# Patient Record
Sex: FEMALE | Race: OTHER | HISPANIC OR LATINO
[De-identification: names, ages, dates, MRNs, and addresses within clinical notes are randomized per-mention and may not be internally consistent; named-entity substitution may affect disease eponyms.]

---

## 2017-02-07 PROBLEM — Z00.00 ENCOUNTER FOR PREVENTIVE HEALTH EXAMINATION: Status: ACTIVE | Noted: 2017-02-07

## 2020-10-05 ENCOUNTER — APPOINTMENT (OUTPATIENT)
Dept: NEUROLOGY | Facility: CLINIC | Age: 80
End: 2020-10-05
Payer: MEDICARE

## 2020-10-05 VITALS — TEMPERATURE: 97.2 F

## 2020-10-05 VITALS — HEART RATE: 102 BPM | SYSTOLIC BLOOD PRESSURE: 138 MMHG | WEIGHT: 162 LBS | DIASTOLIC BLOOD PRESSURE: 92 MMHG

## 2020-10-05 DIAGNOSIS — G20 PARKINSON'S DISEASE: ICD-10-CM

## 2020-10-05 DIAGNOSIS — Z78.9 OTHER SPECIFIED HEALTH STATUS: ICD-10-CM

## 2020-10-05 DIAGNOSIS — G31.84 MILD COGNITIVE IMPAIRMENT, SO STATED: ICD-10-CM

## 2020-10-05 PROCEDURE — 99205 OFFICE O/P NEW HI 60 MIN: CPT

## 2020-10-05 RX ORDER — VALSARTAN 80 MG/1
80 TABLET, COATED ORAL
Qty: 90 | Refills: 0 | Status: COMPLETED | COMMUNITY
Start: 2020-09-10

## 2020-10-05 NOTE — HISTORY OF PRESENT ILLNESS
[FreeTextEntry1] : HPI: 79yo RH HW, with HNT, here for concerns of memory loss.\par \par PMH:\par since -11y, family has noticed the pt is more forgetful, repetitive, and more recently her hygiene has been lacking. This has progressed recently. \par \par -Memory: STM, eventes, conversations\par -Speech ok:\par -Orientation: ok\par -Praxis: seems ok\par -Decision making/Executive fx/Multitasking: limited\par \par -Sleep: seems to be ok; no naps\par \par -Appetite: ok, no changes\par \par -Motor symptoms: limited gait by knees pain, and fear of falling\par \par -B/B: unclear, maybe urine\par \par -Psychiatric symptoms: seems to be in a good mood, no agitation either\par \par -Functional status:\par ADL: needs help and prompting for hygiene, bathing and transfer\par IADL: limited\par CDR: 1.0\par \par -Professional status: \par \par PCP and other physicians:\par -PCP: \par Dr. Jeffrey Chaney\par Doctor in [place], [place]\par Address: [address] [place] [id number]\par Phone: [phone]\par \par Workup done: none.

## 2020-10-05 NOTE — PHYSICAL EXAM
[General Appearance - Alert] : alert [General Appearance - In No Acute Distress] : in no acute distress [Impaired Insight] : insight and judgment were intact [Affect] : the affect was normal [Person] : oriented to person [Remote Intact] : remote memory intact [Registration Intact] : recent registration memory intact [Concentration Intact] : normal concentrating ability [Visual Intact] : visual attention was ~T not ~L decreased [Naming Objects] : no difficulty naming common objects [Repeating Phrases] : no difficulty repeating a phrase [Writing A Sentence] : no difficulty writing a sentence [Fluency] : fluency intact [Comprehension] : comprehension intact [Reading] : reading intact [Past History] : adequate knowledge of personal past history [Vocabulary] : adequate range of vocabulary [Total Score ___ / 30] : the patient achieved a score of [unfilled] /30 [Date / Time ___ / 5] : date / time [unfilled] / 5 [Place ___ / 5] : place [unfilled] / 5 [Registration ___ / 3] : registration [unfilled] / 3 [Serial Sevens ___/5] : serial sevens [unfilled] / 5 [Naming 2 Objects ___ / 2] : naming two objects [unfilled] / 2 [Repeating a Sentence ___ / 1] : repeating a sentence [unfilled] / 1 [Writing a Sentence ___ / 1] : write sentence [unfilled] / 1 [3-stage Verbal Command ___ / 3] : three-stage verbal command [unfilled] / 3 [Written Command ___ / 1] : written command [unfilled] / 1 [Copy a Design ___ / 1] : copy a design [unfilled] / 1 [Recall ___ / 3] : recall [unfilled] / 3 [Cranial Nerves Optic (II)] : visual acuity intact bilaterally,  visual fields full to confrontation, pupils equal round and reactive to light [Cranial Nerves Oculomotor (III)] : extraocular motion intact [Cranial Nerves Trigeminal (V)] : facial sensation intact symmetrically [Cranial Nerves Facial (VII)] : face symmetrical [Cranial Nerves Vestibulocochlear (VIII)] : hearing was intact bilaterally [Cranial Nerves Glossopharyngeal (IX)] : tongue and palate midline [Cranial Nerves Accessory (XI - Cranial And Spinal)] : head turning and shoulder shrug symmetric [Cranial Nerves Hypoglossal (XII)] : there was no tongue deviation with protrusion [Motor Strength] : muscle strength was normal in all four extremities [Involuntary Movements] : no involuntary movements were seen [No Muscle Atrophy] : normal bulk in all four extremities [Motor Handedness Right-Handed] : the patient is right hand dominant [Sensation Tactile Decrease] : light touch was intact [Sensation Pain / Temperature Decrease] : pain and temperature was intact [Sensation Vibration Decrease] : vibration was intact [Proprioception] : proprioception was intact [Balance] : balance was intact [2+] : Brachioradialis left 2+ [1+] : Ankle jerk left 1+ [Sclera] : the sclera and conjunctiva were normal [PERRL With Normal Accommodation] : pupils were equal in size, round, reactive to light, with normal accommodation [Extraocular Movements] : extraocular movements were intact [Optic Disc Abnormality] : the optic disc were normal in size and color [No APD] : no afferent pupillary defect [No JOEY] : no internuclear ophthalmoplegia [Full Visual Field] : full visual field [Outer Ear] : the ears and nose were normal in appearance [Oropharynx] : the oropharynx was normal [Neck Appearance] : the appearance of the neck was normal [Neck Cervical Mass (___cm)] : no neck mass was observed [Jugular Venous Distention Increased] : there was no jugular-venous distention [Thyroid Diffuse Enlargement] : the thyroid was not enlarged [Thyroid Nodule] : there were no palpable thyroid nodules [Auscultation Breath Sounds / Voice Sounds] : lungs were clear to auscultation bilaterally [Heart Rate And Rhythm] : heart rate was normal and rhythm regular [Heart Sounds] : normal S1 and S2 [Heart Sounds Gallop] : no gallops [Murmurs] : no murmurs [Heart Sounds Pericardial Friction Rub] : no pericardial rub [Arterial Pulses Carotid] : carotid pulses were normal with no bruits [Full Pulse] : the pedal pulses are present [Edema] : there was no peripheral edema [Bowel Sounds] : normal bowel sounds [Abdomen Soft] : soft [Abdomen Tenderness] : non-tender [Abdomen Mass (___ Cm)] : no abdominal mass palpated [No CVA Tenderness] : no ~M costovertebral angle tenderness [No Spinal Tenderness] : no spinal tenderness [Abnormal Walk] : normal gait [Nail Clubbing] : no clubbing  or cyanosis of the fingernails [Musculoskeletal - Swelling] : no joint swelling seen [Motor Tone] : muscle strength and tone were normal [Skin Color & Pigmentation] : normal skin color and pigmentation [Skin Turgor] : normal skin turgor [] : no rash [Place] : disoriented to place [Time] : disoriented to time [Short Term Intact] : short term memory impaired [Span Intact] : the attention span was decreased [Current Events] : inadequate knowledge of current events [Motor Strength Upper Extremities Bilaterally] : strength was normal in both upper extremities [Motor Strength Lower Extremities Bilaterally] : strength was normal in both lower extremities [Romberg's Sign] : Romberg's sign was negtive [Allodynia] : no ~T allodynia present [Dysesthesia] : no dysesthesia [Hyperesthesia] : no hyperesthesia [Limited Balance] : balance was intact [Past-pointing] : there was no past-pointing [Tremor] : no tremor present [Dysdiadochokinesia Bilaterally] : not present [Coordination - Dysmetria Impaired Finger-to-Nose Bilateral] : not present [Coordination - Dysmetria Impaired Heel-to-Shin Bilateral] : not present [Plantar Reflex Right Only] : normal on the right [Plantar Reflex Left Only] : normal on the left [FreeTextEntry4] : Mental Status Exam\par Presidents: 0/5\par Alternating Pattern: seems to be writing a word instead\par Spiral: ok\par Clock: 1/3-numbers 1-10 only\par Repetition: ok\par R/L discrimination on self and examiner:\par Cross-line commands: \par Praxis:\par -Motor:\par -Dynamic/Luria:\par -Ideomotor/Imitation: \par -Ideational/writing/closing-in:\par -Dressing:\par  [FreeTextEntry6] : L>R increased tone, paratonic but also cogwheel; reduced MANNY in L>R, UE and LE [FreeTextEntry8] : Slow gait, cautioned, due to pain in knees, but also with reduced swing in LUE.

## 2020-10-05 NOTE — ASSESSMENT
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\par \par Plan:\par Rule out reversible and vascular causes:\par -B vitamins, TFT, RPR\par -MRI brain w/o cornelius\par -Lyme serology\par -try Aricept 5mg and increase as tolerated\par -recommend active lifestyle.\par \par A thorough discussion was entertained with the patient/caregiver regarding the use of psychoactive medications, their possible benefits and AE profile, including the risk of cardiovascular complications, including but not limited to applicable black box warning and teratogenicity, where appropriate.\par We discussed the benefits of being active, physically and mentally, and the need to to establish a routine in this respect.\par Driving abilities and firearms possession and use were discussed, in relation to progression of the cognitive decline, and the need to assess them periodically.\par Patient/caregiver advised to bring previous records to this office.\par Patient/caregiver fully understands and agree with the plan.\par

## 2020-11-29 ENCOUNTER — APPOINTMENT (OUTPATIENT)
Dept: MRI IMAGING | Facility: CLINIC | Age: 80
End: 2020-11-29
Payer: MEDICARE

## 2020-11-29 ENCOUNTER — OUTPATIENT (OUTPATIENT)
Dept: OUTPATIENT SERVICES | Facility: HOSPITAL | Age: 80
LOS: 1 days | End: 2020-11-29
Payer: COMMERCIAL

## 2020-11-29 DIAGNOSIS — G31.84 MILD COGNITIVE IMPAIRMENT OF UNCERTAIN OR UNKNOWN ETIOLOGY: ICD-10-CM

## 2020-11-29 PROCEDURE — 70551 MRI BRAIN STEM W/O DYE: CPT

## 2020-11-29 PROCEDURE — 70551 MRI BRAIN STEM W/O DYE: CPT | Mod: 26

## 2021-09-26 ENCOUNTER — NON-APPOINTMENT (OUTPATIENT)
Age: 81
End: 2021-09-26

## 2021-09-27 ENCOUNTER — LABORATORY RESULT (OUTPATIENT)
Age: 81
End: 2021-09-27

## 2021-09-27 ENCOUNTER — MED ADMIN CHARGE (OUTPATIENT)
Age: 81
End: 2021-09-27

## 2021-09-27 ENCOUNTER — APPOINTMENT (OUTPATIENT)
Dept: GERIATRICS | Facility: CLINIC | Age: 81
End: 2021-09-27
Payer: MEDICARE

## 2021-09-27 VITALS
HEIGHT: 66 IN | WEIGHT: 159.25 LBS | HEART RATE: 105 BPM | RESPIRATION RATE: 15 BRPM | DIASTOLIC BLOOD PRESSURE: 80 MMHG | OXYGEN SATURATION: 96 % | SYSTOLIC BLOOD PRESSURE: 130 MMHG | BODY MASS INDEX: 25.59 KG/M2

## 2021-09-27 DIAGNOSIS — Z23 ENCOUNTER FOR IMMUNIZATION: ICD-10-CM

## 2021-09-27 DIAGNOSIS — E78.5 HYPERLIPIDEMIA, UNSPECIFIED: ICD-10-CM

## 2021-09-27 PROCEDURE — 99204 OFFICE O/P NEW MOD 45 MIN: CPT

## 2021-09-27 PROCEDURE — G0008: CPT

## 2021-09-27 PROCEDURE — 90662 IIV NO PRSV INCREASED AG IM: CPT

## 2021-09-27 RX ORDER — DONEPEZIL HYDROCHLORIDE 10 MG/1
10 TABLET ORAL DAILY
Qty: 30 | Refills: 2 | Status: DISCONTINUED | COMMUNITY
Start: 2020-10-05 | End: 2021-09-27

## 2021-09-27 RX ORDER — ASPIRIN 81 MG/1
81 TABLET, CHEWABLE ORAL
Refills: 0 | Status: ACTIVE | COMMUNITY
Start: 2021-04-01

## 2021-09-28 LAB
25(OH)D3 SERPL-MCNC: 21.6 NG/ML
ANION GAP SERPL CALC-SCNC: 12 MMOL/L
APPEARANCE: ABNORMAL
BASOPHILS # BLD AUTO: 0.04 K/UL
BASOPHILS NFR BLD AUTO: 0.7 %
BILIRUBIN URINE: NEGATIVE
BLOOD URINE: ABNORMAL
BUN SERPL-MCNC: 17 MG/DL
CALCIUM SERPL-MCNC: 9.6 MG/DL
CHLORIDE SERPL-SCNC: 106 MMOL/L
CHOLEST SERPL-MCNC: 146 MG/DL
CO2 SERPL-SCNC: 24 MMOL/L
COLOR: YELLOW
CREAT SERPL-MCNC: 0.71 MG/DL
EOSINOPHIL # BLD AUTO: 0.13 K/UL
EOSINOPHIL NFR BLD AUTO: 2.4 %
ESTIMATED AVERAGE GLUCOSE: 128 MG/DL
FOLATE SERPL-MCNC: >20 NG/ML
GLUCOSE QUALITATIVE U: NEGATIVE
GLUCOSE SERPL-MCNC: 117 MG/DL
HBA1C MFR BLD HPLC: 6.1 %
HCT VFR BLD CALC: 44 %
HDLC SERPL-MCNC: 59 MG/DL
HGB BLD-MCNC: 14.3 G/DL
IMM GRANULOCYTES NFR BLD AUTO: 0.2 %
KETONES URINE: NEGATIVE
LDLC SERPL CALC-MCNC: 57 MG/DL
LEUKOCYTE ESTERASE URINE: ABNORMAL
LYMPHOCYTES # BLD AUTO: 2.17 K/UL
LYMPHOCYTES NFR BLD AUTO: 40 %
MAN DIFF?: NORMAL
MCHC RBC-ENTMCNC: 31 PG
MCHC RBC-ENTMCNC: 32.5 GM/DL
MCV RBC AUTO: 95.4 FL
MONOCYTES # BLD AUTO: 0.42 K/UL
MONOCYTES NFR BLD AUTO: 7.7 %
NEUTROPHILS # BLD AUTO: 2.65 K/UL
NEUTROPHILS NFR BLD AUTO: 49 %
NITRITE URINE: NEGATIVE
NONHDLC SERPL-MCNC: 87 MG/DL
PH URINE: 6
PLATELET # BLD AUTO: 230 K/UL
POTASSIUM SERPL-SCNC: 4.2 MMOL/L
PROTEIN URINE: ABNORMAL
RBC # BLD: 4.61 M/UL
RBC # FLD: 13.2 %
SODIUM SERPL-SCNC: 142 MMOL/L
SPECIFIC GRAVITY URINE: 1.03
TRIGL SERPL-MCNC: 151 MG/DL
TSH SERPL-ACNC: 2.78 UIU/ML
UROBILINOGEN URINE: NORMAL
VIT B12 SERPL-MCNC: 867 PG/ML
WBC # FLD AUTO: 5.42 K/UL

## 2021-09-28 NOTE — HISTORY OF PRESENT ILLNESS
[No falls in past year] : Patient reported no falls in the past year [0] : 2) Feeling down, depressed, or hopeless: Not at all (0) [FreeTextEntry1] : Mrs. Rivera is a 80 yo F coming from home lives with her  Kenny. Accompanied to current visit by her daughter Alysia (who lives in [place]) and her relative / caretaker Zeynep. History obtained from pt and assisted by family.  Ambulates with a cane. Has Hx of mixed dementia (AD, vascular and Parkinsons?), HLD, HTN, bilateral knee OA and low vitamin D.\par \nelli Comes to the office as an initial visit to establish care. Complains of memory problems, daughter reported has noticed this changes for the past 2 years. She reported noticed pt becomes constantly repetitive in her statements. She had memory testing done in Oct 2020 showing score of 16/30 and had L>R increased tone, paratonic but also cogwheel; reduced MANNY in L>R, UE and LE. She was RX Donepezil 5mg BID, but daughter reported this was DC'ed is unsure of reaction from it that made her stop the medication. She had a brain MRI on Nov 2020 showing moderate periventricular, deep and subcortical white matter ischemia and mild atrophy. \par \par Zeynep reported pt becomes resistant to hygiene sometimes and has a hard time getting the pt to shower. She also reported has noticed pt has episodes of urine incontinence when she is not able to make it to the bathroom in time. \par \par He last hospital admission was last year after having a UTI, at that time she was admitted in [facility] for 2 days.\par \par Her  assists her with cooking, housekeeping, taking her medications. Her daughter takes her to doctors appointments. Zeynep is with her from 4pm-9pm daily. \par \par No recent falls reported, she ambulates with a cane. She complains of bilateral knee pain was told 2/2 OA and that she will need a knee replacement but her daughter opted for conservative management. She has tried topical injections with partial relief. She reported will be meeting with Orthopedics today. \par \par Vaccinations: PNA / Shingles vaccine never. Flu shot 2020. \par Colonoscopy /  Mammography /  PAP smear / DEXA scan unsure. \par \par Advance directives not filled out.\par

## 2021-09-28 NOTE — DATA REVIEWED
[FreeTextEntry1] :    MR Head No Cont             Final  No Documents Attached       EXAM:  MR BRAIN   PROCEDURE DATE:  11/29/2020    INTERPRETATION:  MR brain  without gadolinium  CLINICAL INFORMATION: Mild cognitive impairment  TECHNIQUE:   Sagittal and axial T1-weighted images, axial FLAIR images, axial gradient echo and T2-weighted images and axial diffusion weighted images of the brain were obtained.  FINDINGS:   No prior similar studies are available for review.  The brain demonstrates moderate periventricular, deep and subcortical white matter ischemia.   No acute cerebral cortical infarct is found.   No intracranial hemorrhage is recognized. No mass effect is found in the brain.  The ventricles, sulci and basal cisterns show mild atrophy.  The vertebral and internal carotid arteries demonstrate expected flow voids indicating their patency.  The orbits are unremarkable.  The paranasal sinuses are clear. Mild LEFT mastoiditis is noted. The nasal cavity appears intact.  The nasopharynx is symmetric.  The central skull base and temporal bones are intact.  The calvarium appears unremarkable.  IMPRESSION:   moderate periventricular, deep and subcortical white matter ischemia. Mild atrophy. Mild LEFT mastoiditis.       JORJE LOMAS MD; Attending Radiologist This document has been electronically signed. Nov 29 2020  3:34PM      Ordered by: DANTE TORRES       Collected/Examined: 29Nov2020 12:07PM        Verified by: DANTE TORRES 29Nov2020 09:50PM         Result Communication: No patient communication needed at this time; Stage: Final         Performed at: [facility] [place]       Resulted: 29Nov2020 03:37PM       Last Updated: 29Nov2020 09:50PM       Accession: [id number]

## 2021-09-28 NOTE — REASON FOR VISIT
[Initial Evaluation] : an initial evaluation [Family Member] : family member [FreeTextEntry2] : initial visit for memory problems

## 2021-09-28 NOTE — PHYSICAL EXAM
[Alert] : alert [Well Nourished] : well nourished [No Acute Distress] : in no acute distress [Well Developed] : well developed [Sclera] : the sclera and conjunctiva were normal [EOMI] : extraocular movements were intact [PERRL] : pupils were equal in size, round, and reactive to light [Normal Oral Mucosa] : normal oral mucosa [No Oral Pallor] : no oral pallor [Normal Outer Ear/Nose] : the ears and nose were normal in appearance [Normal Hearing] : hearing was normal [Both Tympanic Membranes Were Examined] : both tympanic membranes were normal [Normal Appearance] : the appearance of the neck was normal [Supple] : the neck was supple [No Respiratory Distress] : no respiratory distress [No Acc Muscle Use] : no accessory muscle use [Respiration, Rhythm And Depth] : normal respiratory rhythm and effort [Auscultation Breath Sounds / Voice Sounds] : lungs were clear to auscultation bilaterally [Normal PMI] : the apical impulse was abnormal [Normal S1, S2] : normal S1 and S2 [Heart Rate And Rhythm] : heart rate was normal and rhythm regular [Bowel Sounds] : normal bowel sounds [Abdomen Tenderness] : non-tender [Abdomen Soft] : soft [No CVA Tenderness] : no CVA  tenderness [No Spinal Tenderness] : no spinal tenderness [No Clubbing, Cyanosis] : no clubbing or cyanosis of the fingernails [Motor Tone] : muscle strength and tone were normal [Normal Color / Pigmentation] : normal skin color and pigmentation [] : no rash [Normal Turgor] : normal skin turgor [Skin Lesions] : no skin lesions [Normal Affect] : the affect was normal [Normal Mood] : the mood was normal [Normal Gait] : abnormal gait [de-identified] : slow gait  [de-identified] : AO x 2 (person and place)

## 2021-10-08 RX ORDER — CALCIUM CARBONATE/VITAMIN D3 500MG-5MCG
500-200 TABLET ORAL
Refills: 0 | Status: DISCONTINUED | COMMUNITY
Start: 2020-10-05 | End: 2021-10-08

## 2021-11-12 ENCOUNTER — APPOINTMENT (OUTPATIENT)
Dept: GERIATRICS | Facility: CLINIC | Age: 81
End: 2021-11-12

## 2021-12-03 ENCOUNTER — APPOINTMENT (OUTPATIENT)
Dept: GERIATRICS | Facility: CLINIC | Age: 81
End: 2021-12-03

## 2021-12-23 ENCOUNTER — APPOINTMENT (OUTPATIENT)
Dept: GERIATRICS | Facility: CLINIC | Age: 81
End: 2021-12-23

## 2022-03-04 ENCOUNTER — APPOINTMENT (OUTPATIENT)
Dept: GERIATRICS | Facility: CLINIC | Age: 82
End: 2022-03-04
Payer: MEDICARE

## 2022-03-04 VITALS
TEMPERATURE: 97.8 F | HEIGHT: 66 IN | RESPIRATION RATE: 16 BRPM | SYSTOLIC BLOOD PRESSURE: 142 MMHG | DIASTOLIC BLOOD PRESSURE: 80 MMHG | OXYGEN SATURATION: 96 % | HEART RATE: 98 BPM

## 2022-03-04 DIAGNOSIS — M17.10 UNILATERAL PRIMARY OSTEOARTHRITIS, UNSPECIFIED KNEE: ICD-10-CM

## 2022-03-04 PROCEDURE — 99214 OFFICE O/P EST MOD 30 MIN: CPT

## 2022-03-04 RX ORDER — MULTIVIT-MIN/FOLIC/VIT K/LYCOP 400-300MCG
25 MCG TABLET ORAL DAILY
Qty: 30 | Refills: 5 | Status: ACTIVE | COMMUNITY
Start: 2021-10-08

## 2022-03-04 RX ORDER — DICLOFENAC SODIUM 10 MG/G
1 GEL TOPICAL
Qty: 1 | Refills: 0 | Status: ACTIVE | COMMUNITY
Start: 2022-03-04 | End: 1900-01-01

## 2022-03-04 NOTE — DATA REVIEWED
[FreeTextEntry1] :    MR Head No Cont             Final  No Documents Attached       EXAM:  MR BRAIN   PROCEDURE DATE:  11/29/2020    INTERPRETATION:  MR brain  without gadolinium  CLINICAL INFORMATION: Mild cognitive impairment  TECHNIQUE:   Sagittal and axial T1-weighted images, axial FLAIR images, axial gradient echo and T2-weighted images and axial diffusion weighted images of the brain were obtained.  FINDINGS:   No prior similar studies are available for review.  The brain demonstrates moderate periventricular, deep and subcortical white matter ischemia.   No acute cerebral cortical infarct is found.   No intracranial hemorrhage is recognized. No mass effect is found in the brain.  The ventricles, sulci and basal cisterns show mild atrophy.  The vertebral and internal carotid arteries demonstrate expected flow voids indicating their patency.  The orbits are unremarkable.  The paranasal sinuses are clear. Mild LEFT mastoiditis is noted. The nasal cavity appears intact.  The nasopharynx is symmetric.  The central skull base and temporal bones are intact.  The calvarium appears unremarkable.  IMPRESSION:   moderate periventricular, deep and subcortical white matter ischemia. Mild atrophy. Mild LEFT mastoiditis.       JORJE LOMAS MD; Attending Radiologist This document has been electronically signed. Nov 29 2020  3:34PM      Ordered by: DANTE TORRES       Collected/Examined: 29Nov2020 12:07PM        Verified by: DANTE TORRES 29Nov2020 09:50PM         Result Communication: No patient communication needed at this time; Stage: Final         Performed at: [facility] [place]       Resulted: 29Nov2020 03:37PM       Last Updated: 29Nov2020 09:50PM       Accession: [id number]

## 2022-03-04 NOTE — HISTORY OF PRESENT ILLNESS
[No falls in past year] : Patient reported no falls in the past year [0] : 2) Feeling down, depressed, or hopeless: Not at all (0) [FreeTextEntry1] : Mrs. Rivera is a 83 yo F coming from home lives with her  Kenny. Accompanied to current visit by her daughter Alysia (who lives in [place]).  Ambulates with a cane. Has Hx of mixed dementia (AD, vascular and Parkinsons?), HLD, HTN, bilateral knee OA and low vitamin D.\par \nelli Comes to the office for a follow up visit. Since last visit pt has decrease mobility due to worsening bilateral knee pain. She reported is now mostly wheelchair bound. She has been giving her Tylenol 1gr every 8 hrs with minimal improvement. \par \par She also remains resistant to taking a shower and sometimes get verbally and physically violent with HHA and . She also had episodes of stool and urine incontinence when she doesn’t make it to the bathroom on time, \par \par She also wakes up in the middle of the night confused about where she is and need reassurance from her  to redirect her. \par \par Since last visit she has also started complaining of feeling weak and nauseous and in one occasion her daughter called EMS, she reported at that time her VS were normal and episode self resolved. She does not vomit and episodes seem to be random, not during specific times a day or related to oral intake.  \par \par He last hospital admission was last year after having a UTI, at that time she was admitted in [facility] for 2 days.\par \par Her  assists her with cooking, housekeeping, taking her medications. Her daughter takes her to doctors appointments. Zeynep is with her from 4pm-9pm daily. \par \par No recent falls reported, she ambulates with a cane. She complains of bilateral knee pain was told 2/2 OA and that she will need a knee replacement but her daughter opted for conservative management. She has tried topical injections with partial relief. She reported will be meeting with Orthopedics today. \par \par Advance directives not filled out.\par

## 2022-03-04 NOTE — ASSESSMENT
[FreeTextEntry1] : - Patient has severely limited mobility and is experiencing difficulty with mobility related activities of daily living such as toileting, grooming, and bathing.  mobility limitations cannot be resolved by use of a walker. A transport wheelchair will improve patient's ability to participate in MRADLs in the home. The patient has a caregiver who is available and willing to provide assistance with a transport wheelchair. Her home has adequate access for the use of the wheelchair.\par \par - Will repeat memory testing on next visit, will consider referral to ADC program \par - f/u in 2 month for repeat blood testing

## 2022-03-04 NOTE — PHYSICAL EXAM
[Alert] : alert [Well Nourished] : well nourished [Well Developed] : well developed [Sclera] : the sclera and conjunctiva were normal [EOMI] : extraocular movements were intact [PERRL] : pupils were equal in size, round, and reactive to light [Normal Oral Mucosa] : normal oral mucosa [No Oral Pallor] : no oral pallor [Normal Outer Ear/Nose] : the ears and nose were normal in appearance [Normal Hearing] : hearing was normal [Both Tympanic Membranes Were Examined] : both tympanic membranes were normal [Normal Appearance] : the appearance of the neck was normal [Supple] : the neck was supple [No Respiratory Distress] : no respiratory distress [No Acc Muscle Use] : no accessory muscle use [Respiration, Rhythm And Depth] : normal respiratory rhythm and effort [Auscultation Breath Sounds / Voice Sounds] : lungs were clear to auscultation bilaterally [Normal PMI] : the apical impulse was abnormal [Normal S1, S2] : normal S1 and S2 [Heart Rate And Rhythm] : heart rate was normal and rhythm regular [Bowel Sounds] : normal bowel sounds [Abdomen Tenderness] : non-tender [Abdomen Soft] : soft [No CVA Tenderness] : no CVA  tenderness [No Spinal Tenderness] : no spinal tenderness [No Clubbing, Cyanosis] : no clubbing or cyanosis of the fingernails [Motor Tone] : muscle strength and tone were normal [Normal Color / Pigmentation] : normal skin color and pigmentation [] : no rash [Normal Turgor] : normal skin turgor [Skin Lesions] : no skin lesions [Normal Affect] : the affect was normal [Normal Mood] : the mood was normal [Normal Gait] : abnormal gait [de-identified] : slow gait  [de-identified] : AO x 2 (person and place)

## 2022-04-11 ENCOUNTER — APPOINTMENT (OUTPATIENT)
Dept: NEUROLOGY | Facility: CLINIC | Age: 82
End: 2022-04-11

## 2022-04-11 ENCOUNTER — APPOINTMENT (OUTPATIENT)
Dept: GERIATRICS | Facility: CLINIC | Age: 82
End: 2022-04-11

## 2022-06-03 ENCOUNTER — RX RENEWAL (OUTPATIENT)
Age: 82
End: 2022-06-03

## 2022-10-25 ENCOUNTER — APPOINTMENT (OUTPATIENT)
Dept: NEUROLOGY | Facility: CLINIC | Age: 82
End: 2022-10-25

## 2022-11-14 ENCOUNTER — NON-APPOINTMENT (OUTPATIENT)
Age: 82
End: 2022-11-14

## 2022-11-14 DIAGNOSIS — U07.1 COVID-19: ICD-10-CM

## 2022-11-18 ENCOUNTER — LABORATORY RESULT (OUTPATIENT)
Age: 82
End: 2022-11-18

## 2023-07-18 ENCOUNTER — EMERGENCY (EMERGENCY)
Facility: HOSPITAL | Age: 83
LOS: 1 days | Discharge: HOME | End: 2023-07-18
Attending: EMERGENCY MEDICINE
Payer: COMMERCIAL

## 2023-07-18 VITALS
WEIGHT: 130.07 LBS | OXYGEN SATURATION: 95 % | DIASTOLIC BLOOD PRESSURE: 85 MMHG | TEMPERATURE: 98 F | RESPIRATION RATE: 18 BRPM | HEIGHT: 63 IN | SYSTOLIC BLOOD PRESSURE: 159 MMHG | HEART RATE: 91 BPM

## 2023-07-18 LAB
ALBUMIN SERPL ELPH-MCNC: 4 G/DL — SIGNIFICANT CHANGE UP (ref 3.3–5)
ALP SERPL-CCNC: 61 U/L — SIGNIFICANT CHANGE UP (ref 40–120)
ALT FLD-CCNC: 54 U/L — HIGH (ref 10–45)
ANION GAP SERPL CALC-SCNC: 13 MMOL/L — SIGNIFICANT CHANGE UP (ref 5–17)
AST SERPL-CCNC: 111 U/L — HIGH (ref 10–40)
BASOPHILS # BLD AUTO: 0.04 K/UL — SIGNIFICANT CHANGE UP (ref 0–0.2)
BASOPHILS NFR BLD AUTO: 0.5 % — SIGNIFICANT CHANGE UP (ref 0–2)
BILIRUB SERPL-MCNC: 0.6 MG/DL — SIGNIFICANT CHANGE UP (ref 0.2–1.2)
BUN SERPL-MCNC: 24 MG/DL — HIGH (ref 7–23)
CALCIUM SERPL-MCNC: 9.5 MG/DL — SIGNIFICANT CHANGE UP (ref 8.4–10.5)
CHLORIDE SERPL-SCNC: 108 MMOL/L — SIGNIFICANT CHANGE UP (ref 96–108)
CO2 SERPL-SCNC: 21 MMOL/L — LOW (ref 22–31)
CREAT SERPL-MCNC: 0.69 MG/DL — SIGNIFICANT CHANGE UP (ref 0.5–1.3)
EGFR: 86 ML/MIN/1.73M2 — SIGNIFICANT CHANGE UP
EOSINOPHIL # BLD AUTO: 0.04 K/UL — SIGNIFICANT CHANGE UP (ref 0–0.5)
EOSINOPHIL NFR BLD AUTO: 0.5 % — SIGNIFICANT CHANGE UP (ref 0–6)
GLUCOSE SERPL-MCNC: 107 MG/DL — HIGH (ref 70–99)
HCT VFR BLD CALC: 40.4 % — SIGNIFICANT CHANGE UP (ref 34.5–45)
HGB BLD-MCNC: 13.1 G/DL — SIGNIFICANT CHANGE UP (ref 11.5–15.5)
IMM GRANULOCYTES NFR BLD AUTO: 0.4 % — SIGNIFICANT CHANGE UP (ref 0–0.9)
LYMPHOCYTES # BLD AUTO: 2.1 K/UL — SIGNIFICANT CHANGE UP (ref 1–3.3)
LYMPHOCYTES # BLD AUTO: 25.6 % — SIGNIFICANT CHANGE UP (ref 13–44)
MCHC RBC-ENTMCNC: 31.1 PG — SIGNIFICANT CHANGE UP (ref 27–34)
MCHC RBC-ENTMCNC: 32.4 GM/DL — SIGNIFICANT CHANGE UP (ref 32–36)
MCV RBC AUTO: 96 FL — SIGNIFICANT CHANGE UP (ref 80–100)
MONOCYTES # BLD AUTO: 0.65 K/UL — SIGNIFICANT CHANGE UP (ref 0–0.9)
MONOCYTES NFR BLD AUTO: 7.9 % — SIGNIFICANT CHANGE UP (ref 2–14)
NEUTROPHILS # BLD AUTO: 5.35 K/UL — SIGNIFICANT CHANGE UP (ref 1.8–7.4)
NEUTROPHILS NFR BLD AUTO: 65.1 % — SIGNIFICANT CHANGE UP (ref 43–77)
NRBC # BLD: 0 /100 WBCS — SIGNIFICANT CHANGE UP (ref 0–0)
PLATELET # BLD AUTO: 200 K/UL — SIGNIFICANT CHANGE UP (ref 150–400)
POTASSIUM SERPL-MCNC: 4.4 MMOL/L — SIGNIFICANT CHANGE UP (ref 3.5–5.3)
POTASSIUM SERPL-SCNC: 4.4 MMOL/L — SIGNIFICANT CHANGE UP (ref 3.5–5.3)
PROT SERPL-MCNC: 7.4 G/DL — SIGNIFICANT CHANGE UP (ref 6–8.3)
RBC # BLD: 4.21 M/UL — SIGNIFICANT CHANGE UP (ref 3.8–5.2)
RBC # FLD: 12.9 % — SIGNIFICANT CHANGE UP (ref 10.3–14.5)
SODIUM SERPL-SCNC: 142 MMOL/L — SIGNIFICANT CHANGE UP (ref 135–145)
WBC # BLD: 8.21 K/UL — SIGNIFICANT CHANGE UP (ref 3.8–10.5)
WBC # FLD AUTO: 8.21 K/UL — SIGNIFICANT CHANGE UP (ref 3.8–10.5)

## 2023-07-18 PROCEDURE — 73030 X-RAY EXAM OF SHOULDER: CPT | Mod: 26,LT

## 2023-07-18 PROCEDURE — 99285 EMERGENCY DEPT VISIT HI MDM: CPT

## 2023-07-18 PROCEDURE — 73060 X-RAY EXAM OF HUMERUS: CPT | Mod: 26,LT

## 2023-07-18 PROCEDURE — 71045 X-RAY EXAM CHEST 1 VIEW: CPT | Mod: 26

## 2023-07-18 PROCEDURE — 72125 CT NECK SPINE W/O DYE: CPT | Mod: 26,MA

## 2023-07-18 PROCEDURE — 70450 CT HEAD/BRAIN W/O DYE: CPT | Mod: 26,MA

## 2023-07-18 NOTE — ED PROVIDER NOTE - PATIENT PORTAL LINK FT
You can access the FollowMyHealth Patient Portal offered by [facility] by registering at the following website: [email or web address]. By joining [facility]’s FollowMyHealth portal, you will also be able to view your health information using other applications (apps) compatible with our system.

## 2023-07-18 NOTE — ED ADULT NURSE NOTE - OBJECTIVE STATEMENT
Pt is a 84 y/o female with PMH dementia, HTN, HLD presenting to the ED s/p fall. History obtained from patient's daughter requesting to translate French, reporting patient had single unwitnessed mechanical fall at home last night, fall was heard by  at home. Pt usually ambulates with walker at baseline, has noted to be weaker by family the past few weeks, denies other recent falls. Pt does not remember falling, no blood thinner use present. Pt noted to be alert and resting comfortably in stretcher, following commands, speech is clear, sensory/motor function intact, PERRL, VSS, NAD noted. Pt denies pain/discomfort, changes in vision, dizziness, changes in vision, chest pain, SOB, numbness/tingling. Erythema noted L posterior head behind the ear, ecchymosis present on LUE, R knee. Pt is a 84 y/o female with PMH dementia, HTN, HLD presenting to the ED s/p fall. History obtained from patient's daughter requesting to translate Indonesian, reporting patient had single unwitnessed mechanical fall at home last night, fall was heard by  at home. Pt usually ambulates with walker at baseline, has noted to be weaker by family the past few weeks, denies other recent falls. Pt does not remember falling, no blood thinner use present. Pt noted to be alert and oriented to person and place, resting comfortably in stretcher, following commands, speech is clear, sensory/motor function intact, PERRL, VSS, NAD noted. Pt denies pain/discomfort, changes in vision, dizziness, changes in vision, chest pain, SOB, numbness/tingling. Erythema noted L posterior head behind the ear, ecchymosis present on LUE, R knee.

## 2023-07-18 NOTE — ED PROVIDER NOTE - PROGRESS NOTE DETAILS
Leonor Najera MD PGY-2: abd CT without acute traumatic injury. UA concerning for infection, will treat with 1 g Ceftriaxone and additional 5 day course of cefuroxime. daughter updated by phone. Plan for nonemergent transport home with home health aide. encouraged PMD follow up within next 1 week for re-evaluation. daughter expressed understanding of this plan. patient dc home in good condition. Leonor Najera MD PGY-2: LFTs still elevated, second sample not hemolyzed. will proceed with CT abd. daughter updated by phone. head/c spine CTs, XRs without acute traumatic findings. Leonor Najera MD PGY-2: LFTs elevated, specimen moderately hemolyzed. no abd tenderness on exam, however will repeat LFTs and would consider CT abd if persistently elevated to rule out possible liver injury

## 2023-07-18 NOTE — ED PROVIDER NOTE - PHYSICAL EXAMINATION
GENERAL: Awake, alert, NAD  HEAD: NC/AT, neck supple, moist mucous membranes  EYES: PERRL, EOM grossly intact, sclera anicteric  LUNGS: normal WOB on RA, CTAB, no wheezes or crackles   CARDIAC: RRR, no m/r/g  ABDOMEN: Soft, non tender, non distended, no rebound, no guarding  BACK: No midline spinal tenderness, no CVA tenderness  EXT: No edema, no calf tenderness, no deformities.  NEURO: A&Ox3. Moving all extremities.  SKIN: Warm and dry. No rash.  PSYCH: Normal affect. GENERAL: Awake, alert, NAD  HEAD: small area of erythema/swelling to posterior left scalp, neck supple, no midline cervical TTP, moist mucous membranes  EYES: PERRL, EOM grossly intact, sclera anicteric  LUNGS: normal WOB on RA, CTAB, no wheezes or crackles   CARDIAC: RRR, no m/r/g  ABDOMEN: Soft, non tender, non distended, no rebound, no guarding  BACK: No midline spinal tenderness, no CVA tenderness  EXT: Bruising noted to proximal left arm, full ROM of left shoulder, no bony deformities noted.  No lower extremity edema, no calf tenderness.  NEURO: A&Ox3. Moving all extremities. CN II-XII grossly intact bilaterally. strength 5/5 in all extremities. no focal deficits.   SKIN: Warm and dry. No rash.  PSYCH: Normal affect. GENERAL: Awake, alert, NAD  HEAD: small area of erythema/swelling to posterior left scalp, neck supple, no midline cervical TTP, moist mucous membranes  EYES: PERRL, EOM grossly intact, sclera anicteric  LUNGS: normal WOB on RA, CTAB, no wheezes or crackles   CARDIAC: RRR, no m/r/g  ABDOMEN: Soft, non tender, non distended, no rebound, no guarding  BACK: No midline spinal tenderness, no CVA tenderness  EXT: Bruising noted to proximal left arm, full ROM of left shoulder, no bony deformities noted.  No lower extremity edema, no calf tenderness.  NEURO: Moving all extremities. CN II-XII grossly intact bilaterally. strength 5/5 in all extremities. no focal deficits.   SKIN: Warm and dry. No rash.  PSYCH: Normal affect.

## 2023-07-18 NOTE — ED PROVIDER NOTE - ATTENDING CONTRIBUTION TO CARE
edited by Karen Jama DO - attending physician.   Please see progress notes for status/labs/consult updates and ED course after initial presentation.

## 2023-07-18 NOTE — ED PROVIDER NOTE - OBJECTIVE STATEMENT
83Y F PMH 83Y F PMH dementia, HTN here with home aide after unwitnessed fall at home last night. Hx provided by daughter via phone. Patient lives at home with , uses a walker at baseline.  found patient on the floor next to bed last night, patient suspected to have fallen but this was unwitnessed. Patient does not remember falling, does not know if she hit her head or lost consciousness. Aide noticed a bump on the back of patient's head today, sent photo to daughter who was concerned and called EMS to bring patient to the hospital. Patient currently has no complaints. Is at mental status baseline per aide/daughter.

## 2023-07-18 NOTE — ED PROVIDER NOTE - NSFOLLOWUPINSTRUCTIONS_ED_ALL_ED_FT
You were seen in the emergency department today after a fall at home.    All of your imaging today including CT scans of the head, neck, and abdomen were within normal limits.  Your blood work showed slightly elevated liver enzymes.  You should follow-up with your primary doctor to have this testing repeated. your urine showed evidence of infection.  All the results of the testing that was done today are included in this paperwork.    You were given a dose of an antibiotic called ceftriaxone through the IV while you were in the emergency department.  I sent a prescription for 5 days of an additional antibiotic called cefuroxime to your pharmacy.  Please take this twice per day for 5 days starting tomorrow.    You should follow-up with your primary doctor within the next 1 week to be reevaluated.    If you develop any new or worsening symptoms including headaches, loss of consciousness, chest pain, difficulty breathing, fevers, or you are otherwise concerned, please come back to the ER to be reevaluated.

## 2023-07-18 NOTE — ED PROVIDER NOTE - CLINICAL SUMMARY MEDICAL DECISION MAKING FREE TEXT BOX
83Y F PMH dementia, HTN here with home aide after unwitnessed fall last night, found on ground by bed this AM. Unknown LOC. Patient well appearing, at mental status baseline per aide, family. Area of erythema/mild swelling behind L ear, with bruising to L proximal arm. No AC use. Plan basic labs, CT head, C spine, XRs chest, L shoulder/humerus, UA/UC, reassess. if no acute traumatic injury likely dc home. spoke with daughter by phone who confirms history, requests additional updates.

## 2023-07-19 VITALS
DIASTOLIC BLOOD PRESSURE: 76 MMHG | TEMPERATURE: 98 F | OXYGEN SATURATION: 97 % | RESPIRATION RATE: 16 BRPM | HEART RATE: 77 BPM | SYSTOLIC BLOOD PRESSURE: 145 MMHG

## 2023-07-19 LAB
ALBUMIN SERPL ELPH-MCNC: 3.9 G/DL — SIGNIFICANT CHANGE UP (ref 3.3–5)
ALP SERPL-CCNC: 59 U/L — SIGNIFICANT CHANGE UP (ref 40–120)
ALT FLD-CCNC: 50 U/L — HIGH (ref 10–45)
ANION GAP SERPL CALC-SCNC: 12 MMOL/L — SIGNIFICANT CHANGE UP (ref 5–17)
APPEARANCE UR: CLEAR — SIGNIFICANT CHANGE UP
AST SERPL-CCNC: 92 U/L — HIGH (ref 10–40)
BACTERIA # UR AUTO: NEGATIVE — SIGNIFICANT CHANGE UP
BILIRUB SERPL-MCNC: 0.6 MG/DL — SIGNIFICANT CHANGE UP (ref 0.2–1.2)
BILIRUB UR-MCNC: NEGATIVE — SIGNIFICANT CHANGE UP
BUN SERPL-MCNC: 22 MG/DL — SIGNIFICANT CHANGE UP (ref 7–23)
CALCIUM SERPL-MCNC: 9.5 MG/DL — SIGNIFICANT CHANGE UP (ref 8.4–10.5)
CHLORIDE SERPL-SCNC: 109 MMOL/L — HIGH (ref 96–108)
CO2 SERPL-SCNC: 23 MMOL/L — SIGNIFICANT CHANGE UP (ref 22–31)
COLOR SPEC: SIGNIFICANT CHANGE UP
CREAT SERPL-MCNC: 0.71 MG/DL — SIGNIFICANT CHANGE UP (ref 0.5–1.3)
DIFF PNL FLD: NEGATIVE — SIGNIFICANT CHANGE UP
EGFR: 84 ML/MIN/1.73M2 — SIGNIFICANT CHANGE UP
EPI CELLS # UR: 3 /HPF — SIGNIFICANT CHANGE UP
GLUCOSE SERPL-MCNC: 116 MG/DL — HIGH (ref 70–99)
GLUCOSE UR QL: NEGATIVE — SIGNIFICANT CHANGE UP
HYALINE CASTS # UR AUTO: 4 /LPF — HIGH (ref 0–2)
KETONES UR-MCNC: ABNORMAL
LEUKOCYTE ESTERASE UR-ACNC: ABNORMAL
NITRITE UR-MCNC: NEGATIVE — SIGNIFICANT CHANGE UP
PH UR: 6.5 — SIGNIFICANT CHANGE UP (ref 5–8)
POTASSIUM SERPL-MCNC: 3.2 MMOL/L — LOW (ref 3.5–5.3)
POTASSIUM SERPL-SCNC: 3.2 MMOL/L — LOW (ref 3.5–5.3)
PROT SERPL-MCNC: 6.8 G/DL — SIGNIFICANT CHANGE UP (ref 6–8.3)
PROT UR-MCNC: ABNORMAL
RBC CASTS # UR COMP ASSIST: 1 /HPF — SIGNIFICANT CHANGE UP (ref 0–4)
SODIUM SERPL-SCNC: 144 MMOL/L — SIGNIFICANT CHANGE UP (ref 135–145)
SP GR SPEC: 1.02 — SIGNIFICANT CHANGE UP (ref 1.01–1.02)
UROBILINOGEN FLD QL: NEGATIVE — SIGNIFICANT CHANGE UP
WBC UR QL: 42 /HPF — HIGH (ref 0–5)

## 2023-07-19 PROCEDURE — 81001 URINALYSIS AUTO W/SCOPE: CPT

## 2023-07-19 PROCEDURE — 74177 CT ABD & PELVIS W/CONTRAST: CPT | Mod: MA

## 2023-07-19 PROCEDURE — 36415 COLL VENOUS BLD VENIPUNCTURE: CPT

## 2023-07-19 PROCEDURE — 99285 EMERGENCY DEPT VISIT HI MDM: CPT | Mod: 25

## 2023-07-19 PROCEDURE — 71045 X-RAY EXAM CHEST 1 VIEW: CPT

## 2023-07-19 PROCEDURE — 87086 URINE CULTURE/COLONY COUNT: CPT

## 2023-07-19 PROCEDURE — 80053 COMPREHEN METABOLIC PANEL: CPT

## 2023-07-19 PROCEDURE — 73030 X-RAY EXAM OF SHOULDER: CPT

## 2023-07-19 PROCEDURE — 93005 ELECTROCARDIOGRAM TRACING: CPT

## 2023-07-19 PROCEDURE — 85025 COMPLETE CBC W/AUTO DIFF WBC: CPT

## 2023-07-19 PROCEDURE — 74177 CT ABD & PELVIS W/CONTRAST: CPT | Mod: 26,MA

## 2023-07-19 PROCEDURE — 72125 CT NECK SPINE W/O DYE: CPT | Mod: MA

## 2023-07-19 PROCEDURE — 96374 THER/PROPH/DIAG INJ IV PUSH: CPT | Mod: XU

## 2023-07-19 PROCEDURE — 70450 CT HEAD/BRAIN W/O DYE: CPT | Mod: MA

## 2023-07-19 PROCEDURE — 73060 X-RAY EXAM OF HUMERUS: CPT

## 2023-07-19 RX ORDER — POTASSIUM CHLORIDE 20 MEQ
40 PACKET (EA) ORAL ONCE
Refills: 0 | Status: COMPLETED | OUTPATIENT
Start: 2023-07-19 | End: 2023-07-19

## 2023-07-19 RX ORDER — CEFTRIAXONE 500 MG/1
1000 INJECTION, POWDER, FOR SOLUTION INTRAMUSCULAR; INTRAVENOUS ONCE
Refills: 0 | Status: COMPLETED | OUTPATIENT
Start: 2023-07-19 | End: 2023-07-19

## 2023-07-19 RX ORDER — CEFUROXIME AXETIL 250 MG
1 TABLET ORAL
Qty: 10 | Refills: 0
Start: 2023-07-19 | End: 2023-07-23

## 2023-07-19 RX ADMIN — CEFTRIAXONE 100 MILLIGRAM(S): 500 INJECTION, POWDER, FOR SOLUTION INTRAMUSCULAR; INTRAVENOUS at 04:49

## 2023-07-19 RX ADMIN — Medication 40 MILLIEQUIVALENT(S): at 01:49

## 2023-07-19 NOTE — ED ADULT NURSE REASSESSMENT NOTE - NS ED NURSE REASSESS COMMENT FT1
2 RN's present at bedside for sterile confirmation to perform straight cath. Pt tolerated procedure well. 450cc concentrated urine drained from bladder. Diaper changed, pt repositioned in stretcher for comfort.

## 2023-07-20 LAB
CULTURE RESULTS: SIGNIFICANT CHANGE UP
SPECIMEN SOURCE: SIGNIFICANT CHANGE UP

## 2023-08-01 PROBLEM — I10 ESSENTIAL (PRIMARY) HYPERTENSION: Chronic | Status: ACTIVE | Noted: 2023-07-18

## 2023-08-01 PROBLEM — F03.90 UNSPECIFIED DEMENTIA WITHOUT BEHAVIORAL DISTURBANCE: Chronic | Status: ACTIVE | Noted: 2023-07-18

## 2023-08-01 RX ORDER — NIRMATRELVIR AND RITONAVIR 300-100 MG
20 X 150 MG & KIT ORAL
Qty: 30 | Refills: 0 | Status: DISCONTINUED | COMMUNITY
Start: 2022-11-14 | End: 2023-08-01

## 2023-08-07 ENCOUNTER — NON-APPOINTMENT (OUTPATIENT)
Age: 83
End: 2023-08-07

## 2023-08-08 ENCOUNTER — INPATIENT (INPATIENT)
Facility: HOSPITAL | Age: 83
LOS: 2 days | Discharge: HOME | End: 2023-08-11
Attending: INTERNAL MEDICINE | Admitting: INTERNAL MEDICINE
Payer: MEDICARE

## 2023-08-08 VITALS
HEIGHT: 63 IN | DIASTOLIC BLOOD PRESSURE: 86 MMHG | OXYGEN SATURATION: 99 % | HEART RATE: 99 BPM | RESPIRATION RATE: 16 BRPM | SYSTOLIC BLOOD PRESSURE: 135 MMHG | TEMPERATURE: 99 F

## 2023-08-08 DIAGNOSIS — I10 ESSENTIAL (PRIMARY) HYPERTENSION: ICD-10-CM

## 2023-08-08 DIAGNOSIS — E78.5 HYPERLIPIDEMIA, UNSPECIFIED: ICD-10-CM

## 2023-08-08 DIAGNOSIS — Z29.9 ENCOUNTER FOR PROPHYLACTIC MEASURES, UNSPECIFIED: ICD-10-CM

## 2023-08-08 DIAGNOSIS — R45.1 RESTLESSNESS AND AGITATION: ICD-10-CM

## 2023-08-08 DIAGNOSIS — G93.40 ENCEPHALOPATHY, UNSPECIFIED: ICD-10-CM

## 2023-08-08 DIAGNOSIS — F03.90 UNSPECIFIED DEMENTIA WITHOUT BEHAVIORAL DISTURBANCE: ICD-10-CM

## 2023-08-08 DIAGNOSIS — Z79.899 OTHER LONG TERM (CURRENT) DRUG THERAPY: ICD-10-CM

## 2023-08-08 DIAGNOSIS — N39.0 URINARY TRACT INFECTION, SITE NOT SPECIFIED: ICD-10-CM

## 2023-08-08 DIAGNOSIS — M62.82 RHABDOMYOLYSIS: ICD-10-CM

## 2023-08-08 LAB
ALBUMIN SERPL ELPH-MCNC: 3.9 G/DL — SIGNIFICANT CHANGE UP (ref 3.3–5)
ALP SERPL-CCNC: 137 U/L — HIGH (ref 40–120)
ALT FLD-CCNC: 32 U/L — SIGNIFICANT CHANGE UP (ref 4–33)
ANION GAP SERPL CALC-SCNC: 14 MMOL/L — SIGNIFICANT CHANGE UP (ref 7–14)
APPEARANCE UR: ABNORMAL
AST SERPL-CCNC: 38 U/L — HIGH (ref 4–32)
BACTERIA # UR AUTO: ABNORMAL /HPF
BASE EXCESS BLDV CALC-SCNC: 3.6 MMOL/L — HIGH (ref -2–3)
BASOPHILS # BLD AUTO: 0.03 K/UL — SIGNIFICANT CHANGE UP (ref 0–0.2)
BASOPHILS NFR BLD AUTO: 0.4 % — SIGNIFICANT CHANGE UP (ref 0–2)
BILIRUB SERPL-MCNC: 0.6 MG/DL — SIGNIFICANT CHANGE UP (ref 0.2–1.2)
BILIRUB UR-MCNC: NEGATIVE — SIGNIFICANT CHANGE UP
BUN SERPL-MCNC: 32 MG/DL — HIGH (ref 7–23)
CA-I SERPL-SCNC: 1.26 MMOL/L — SIGNIFICANT CHANGE UP (ref 1.15–1.33)
CALCIUM SERPL-MCNC: 10.2 MG/DL — SIGNIFICANT CHANGE UP (ref 8.4–10.5)
CAST: 8 /LPF — HIGH (ref 0–4)
CHLORIDE BLDV-SCNC: 106 MMOL/L — SIGNIFICANT CHANGE UP (ref 96–108)
CHLORIDE SERPL-SCNC: 105 MMOL/L — SIGNIFICANT CHANGE UP (ref 98–107)
CO2 BLDV-SCNC: 29.8 MMOL/L — HIGH (ref 22–26)
CO2 SERPL-SCNC: 25 MMOL/L — SIGNIFICANT CHANGE UP (ref 22–31)
COLOR SPEC: SIGNIFICANT CHANGE UP
CREAT SERPL-MCNC: 0.89 MG/DL — SIGNIFICANT CHANGE UP (ref 0.5–1.3)
DIFF PNL FLD: NEGATIVE — SIGNIFICANT CHANGE UP
EGFR: 64 ML/MIN/1.73M2 — SIGNIFICANT CHANGE UP
EOSINOPHIL # BLD AUTO: 0.12 K/UL — SIGNIFICANT CHANGE UP (ref 0–0.5)
EOSINOPHIL NFR BLD AUTO: 1.5 % — SIGNIFICANT CHANGE UP (ref 0–6)
GAS PNL BLDV: 141 MMOL/L — SIGNIFICANT CHANGE UP (ref 136–145)
GAS PNL BLDV: SIGNIFICANT CHANGE UP
GLUCOSE BLDV-MCNC: 123 MG/DL — HIGH (ref 70–99)
GLUCOSE SERPL-MCNC: 115 MG/DL — HIGH (ref 70–99)
GLUCOSE UR QL: NEGATIVE MG/DL — SIGNIFICANT CHANGE UP
HCO3 BLDV-SCNC: 28 MMOL/L — SIGNIFICANT CHANGE UP (ref 22–29)
HCT VFR BLD CALC: 43.9 % — SIGNIFICANT CHANGE UP (ref 34.5–45)
HCT VFR BLDA CALC: 44 % — SIGNIFICANT CHANGE UP (ref 34.5–46.5)
HGB BLD CALC-MCNC: 14.6 G/DL — SIGNIFICANT CHANGE UP (ref 11.7–16.1)
HGB BLD-MCNC: 14.1 G/DL — SIGNIFICANT CHANGE UP (ref 11.5–15.5)
IANC: 4.85 K/UL — SIGNIFICANT CHANGE UP (ref 1.8–7.4)
IMM GRANULOCYTES NFR BLD AUTO: 0.3 % — SIGNIFICANT CHANGE UP (ref 0–0.9)
KETONES UR-MCNC: 15 MG/DL
LACTATE BLDV-MCNC: 1.3 MMOL/L — SIGNIFICANT CHANGE UP (ref 0.5–2)
LEUKOCYTE ESTERASE UR-ACNC: ABNORMAL
LYMPHOCYTES # BLD AUTO: 2.18 K/UL — SIGNIFICANT CHANGE UP (ref 1–3.3)
LYMPHOCYTES # BLD AUTO: 28 % — SIGNIFICANT CHANGE UP (ref 13–44)
MAGNESIUM SERPL-MCNC: 2.6 MG/DL — SIGNIFICANT CHANGE UP (ref 1.6–2.6)
MCHC RBC-ENTMCNC: 30.3 PG — SIGNIFICANT CHANGE UP (ref 27–34)
MCHC RBC-ENTMCNC: 32.1 GM/DL — SIGNIFICANT CHANGE UP (ref 32–36)
MCV RBC AUTO: 94.2 FL — SIGNIFICANT CHANGE UP (ref 80–100)
MONOCYTES # BLD AUTO: 0.58 K/UL — SIGNIFICANT CHANGE UP (ref 0–0.9)
MONOCYTES NFR BLD AUTO: 7.5 % — SIGNIFICANT CHANGE UP (ref 2–14)
NEUTROPHILS # BLD AUTO: 4.85 K/UL — SIGNIFICANT CHANGE UP (ref 1.8–7.4)
NEUTROPHILS NFR BLD AUTO: 62.3 % — SIGNIFICANT CHANGE UP (ref 43–77)
NITRITE UR-MCNC: NEGATIVE — SIGNIFICANT CHANGE UP
NRBC # BLD: 0 /100 WBCS — SIGNIFICANT CHANGE UP (ref 0–0)
NRBC # FLD: 0 K/UL — SIGNIFICANT CHANGE UP (ref 0–0)
PCO2 BLDV: 43 MMHG — SIGNIFICANT CHANGE UP (ref 39–52)
PH BLDV: 7.43 — SIGNIFICANT CHANGE UP (ref 7.32–7.43)
PH UR: 6 — SIGNIFICANT CHANGE UP (ref 5–8)
PLATELET # BLD AUTO: 286 K/UL — SIGNIFICANT CHANGE UP (ref 150–400)
PO2 BLDV: 54 MMHG — HIGH (ref 25–45)
POTASSIUM BLDV-SCNC: 4.6 MMOL/L — SIGNIFICANT CHANGE UP (ref 3.5–5.1)
POTASSIUM SERPL-MCNC: 4.4 MMOL/L — SIGNIFICANT CHANGE UP (ref 3.5–5.3)
POTASSIUM SERPL-SCNC: 4.4 MMOL/L — SIGNIFICANT CHANGE UP (ref 3.5–5.3)
PROT SERPL-MCNC: 7.9 G/DL — SIGNIFICANT CHANGE UP (ref 6–8.3)
PROT UR-MCNC: 30 MG/DL
RBC # BLD: 4.66 M/UL — SIGNIFICANT CHANGE UP (ref 3.8–5.2)
RBC # FLD: 12.9 % — SIGNIFICANT CHANGE UP (ref 10.3–14.5)
RBC CASTS # UR COMP ASSIST: 2 /HPF — SIGNIFICANT CHANGE UP (ref 0–4)
REVIEW: SIGNIFICANT CHANGE UP
SAO2 % BLDV: 81.4 % — SIGNIFICANT CHANGE UP (ref 67–88)
SODIUM SERPL-SCNC: 144 MMOL/L — SIGNIFICANT CHANGE UP (ref 135–145)
SP GR SPEC: 1.03 — HIGH (ref 1–1.03)
SQUAMOUS # UR AUTO: 8 /HPF — HIGH (ref 0–5)
UROBILINOGEN FLD QL: 0.2 MG/DL — SIGNIFICANT CHANGE UP (ref 0.2–1)
WBC # BLD: 7.78 K/UL — SIGNIFICANT CHANGE UP (ref 3.8–10.5)
WBC # FLD AUTO: 7.78 K/UL — SIGNIFICANT CHANGE UP (ref 3.8–10.5)
WBC UR QL: 53 /HPF — HIGH (ref 0–5)

## 2023-08-08 PROCEDURE — 99223 1ST HOSP IP/OBS HIGH 75: CPT

## 2023-08-08 PROCEDURE — 71045 X-RAY EXAM CHEST 1 VIEW: CPT | Mod: 26

## 2023-08-08 PROCEDURE — 99285 EMERGENCY DEPT VISIT HI MDM: CPT

## 2023-08-08 RX ORDER — SODIUM CHLORIDE 9 MG/ML
1000 INJECTION INTRAMUSCULAR; INTRAVENOUS; SUBCUTANEOUS
Refills: 0 | Status: DISCONTINUED | OUTPATIENT
Start: 2023-08-08 | End: 2023-08-09

## 2023-08-08 RX ORDER — CEFTRIAXONE 500 MG/1
1000 INJECTION, POWDER, FOR SOLUTION INTRAMUSCULAR; INTRAVENOUS ONCE
Refills: 0 | Status: COMPLETED | OUTPATIENT
Start: 2023-08-08 | End: 2023-08-08

## 2023-08-08 RX ORDER — ACETAMINOPHEN 500 MG
1000 TABLET ORAL ONCE
Refills: 0 | Status: COMPLETED | OUTPATIENT
Start: 2023-08-08 | End: 2023-08-08

## 2023-08-08 RX ORDER — QUETIAPINE FUMARATE 200 MG/1
25 TABLET, FILM COATED ORAL AT BEDTIME
Refills: 0 | Status: DISCONTINUED | OUTPATIENT
Start: 2023-08-08 | End: 2023-08-11

## 2023-08-08 RX ORDER — ATORVASTATIN CALCIUM 80 MG/1
40 TABLET, FILM COATED ORAL AT BEDTIME
Refills: 0 | Status: DISCONTINUED | OUTPATIENT
Start: 2023-08-08 | End: 2023-08-08

## 2023-08-08 RX ORDER — ENOXAPARIN SODIUM 100 MG/ML
40 INJECTION SUBCUTANEOUS EVERY 24 HOURS
Refills: 0 | Status: DISCONTINUED | OUTPATIENT
Start: 2023-08-08 | End: 2023-08-11

## 2023-08-08 RX ORDER — ACETAMINOPHEN 500 MG
650 TABLET ORAL EVERY 6 HOURS
Refills: 0 | Status: DISCONTINUED | OUTPATIENT
Start: 2023-08-08 | End: 2023-08-11

## 2023-08-08 RX ORDER — CEFTRIAXONE 500 MG/1
1000 INJECTION, POWDER, FOR SOLUTION INTRAMUSCULAR; INTRAVENOUS EVERY 24 HOURS
Refills: 0 | Status: DISCONTINUED | OUTPATIENT
Start: 2023-08-09 | End: 2023-08-11

## 2023-08-08 RX ORDER — LANOLIN ALCOHOL/MO/W.PET/CERES
3 CREAM (GRAM) TOPICAL AT BEDTIME
Refills: 0 | Status: DISCONTINUED | OUTPATIENT
Start: 2023-08-08 | End: 2023-08-11

## 2023-08-08 RX ORDER — SODIUM CHLORIDE 9 MG/ML
1000 INJECTION INTRAMUSCULAR; INTRAVENOUS; SUBCUTANEOUS ONCE
Refills: 0 | Status: COMPLETED | OUTPATIENT
Start: 2023-08-08 | End: 2023-08-08

## 2023-08-08 RX ADMIN — QUETIAPINE FUMARATE 25 MILLIGRAM(S): 200 TABLET, FILM COATED ORAL at 22:33

## 2023-08-08 RX ADMIN — SODIUM CHLORIDE 100 MILLILITER(S): 9 INJECTION INTRAMUSCULAR; INTRAVENOUS; SUBCUTANEOUS at 22:33

## 2023-08-08 RX ADMIN — Medication 400 MILLIGRAM(S): at 17:27

## 2023-08-08 RX ADMIN — CEFTRIAXONE 100 MILLIGRAM(S): 500 INJECTION, POWDER, FOR SOLUTION INTRAMUSCULAR; INTRAVENOUS at 20:08

## 2023-08-08 RX ADMIN — SODIUM CHLORIDE 1000 MILLILITER(S): 9 INJECTION INTRAMUSCULAR; INTRAVENOUS; SUBCUTANEOUS at 17:27

## 2023-08-08 NOTE — H&P ADULT - PROBLEM SELECTOR PLAN 1
Agitation, yelling at home likely in setting of acute UTI vs worsening dementia  -CTX, f/u UCx, not septic  -quetiapine 25mg qhs for now  -currently redirectable and pleasant. Not verbally or physically agitated. No indication for 1:1 at this time  - c/s in AM regarding antipsychotic medication recommendations

## 2023-08-08 NOTE — ED PROVIDER NOTE - CLINICAL SUMMARY MEDICAL DECISION MAKING FREE TEXT BOX
83-year-old female with Alzheimer's dementia now coming in with worsening agitation.  Hemodynamically stable.  Will evaluate for UTI, PNA, electrolyte imbalance, rhabdo.  Will start IV fluids. Likely admit.

## 2023-08-08 NOTE — ED PROVIDER NOTE - OBJECTIVE STATEMENT
83-year-old female Lithuanian-speaking past medical history of Alzheimer's dementia (fear of falls), hypertension now presents with agitation and inconsolability for the past 10 days progressive, reduced appetite for the past 4 days, dark discoloration of urine for the past 2 days.  Patient brought to the ED for further evaluation.

## 2023-08-08 NOTE — H&P ADULT - NSHPLABSRESULTS_GEN_ALL_CORE
Personally reviewed labs:                        14.1   7.78  )-----------( 286      ( 08 Aug 2023 16:14 )             43.9     23 @ 16:14    144  |  105  |  32<H>             --------------------------< 115<H>     4.4  |  25  | 0.89    eGFR AA: --  eGFR N-AA: --    Calcium: 10.2  Phosphorus: --  Magnesium: 2.60    AST: 38<H>    ALT: 32  AlkPhos: 137<H>  Protein: 7.9  Albumin: 3.9  TBili: 0.6  D-Bili: --    VBG - ( 08 Aug 2023 16:14 )  pH: 7.43  /  pCO2: 43    /  pO2: 54    / HCO3: 28    / Base Excess: 3.6   /  SvO2: 81.4  / Lactate: 1.3      Urinalysis Basic - ( 08 Aug 2023 19:37 )    Color: Dark Yellow / Appearance: Cloudy / S.031 / pH: x  Gluc: x / Ketone: 15 mg/dL  / Bili: Negative / Urobili: 0.2 mg/dL   Blood: x / Protein: 30 mg/dL / Nitrite: Negative   Leuk Esterase: Moderate / RBC: 2 /HPF / WBC 53 /HPF   Sq Epi: x / Non Sq Epi: 8 /HPF / Bacteria: Occasional /HPF            RADIOLOGY & ADDITIONAL TESTS:    EKG ordered    CXR my independent interpretation: no focal consolidation Personally reviewed labs:                        14.1   7.78  )-----------( 286      ( 08 Aug 2023 16:14 )             43.9     23 @ 16:14    144  |  105  |  32<H>             --------------------------< 115<H>     4.4  |  25  | 0.89    eGFR AA: --  eGFR N-AA: --    Calcium: 10.2  Phosphorus: --  Magnesium: 2.60    AST: 38<H>    ALT: 32  AlkPhos: 137<H>  Protein: 7.9  Albumin: 3.9  TBili: 0.6  D-Bili: --    VBG - ( 08 Aug 2023 16:14 )  pH: 7.43  /  pCO2: 43    /  pO2: 54    / HCO3: 28    / Base Excess: 3.6   /  SvO2: 81.4  / Lactate: 1.3      Urinalysis Basic - ( 08 Aug 2023 19:37 )    Color: Dark Yellow / Appearance: Cloudy / S.031 / pH: x  Gluc: x / Ketone: 15 mg/dL  / Bili: Negative / Urobili: 0.2 mg/dL   Blood: x / Protein: 30 mg/dL / Nitrite: Negative   Leuk Esterase: Moderate / RBC: 2 /HPF / WBC 53 /HPF   Sq Epi: x / Non Sq Epi: 8 /HPF / Bacteria: Occasional /HPF            RADIOLOGY & ADDITIONAL TESTS:    EKG my interpretation sinus arrhythmia vs PACs, QRS with every P wave, no heart block, no ST changes.     CXR my independent interpretation: no focal consolidation

## 2023-08-08 NOTE — H&P ADULT - HISTORY OF PRESENT ILLNESS
83F with PMHx Alzheimer's dementia (many years), HTN, HLD presenting with agitation and decreased appetite from home. Patient is a limited historian and when asked why she is in the hospital with  she said "nothing special" and "brought in for problems" but she cannot further elaborate. She denies fevers, chills, CP, SOB, abdominal pain, cough, vomiting, diarrhea, hematuria, dysuria, cloudy urine or bladder pain. Collateral also obtained from daughter at bedside who states she has been screaming and yelling at home but not physically agitated over the last several days. She was prescribed quetiapine and she increased dose from 25mg to up to 25mg TID over the last several days but this did not help. She is not on any other antipsychotics at this time. She also has been having decreased PO intake and dark urine. She is only drinking ensure. She lives at home with her  and uses a walker to ambulate. She is becoming somewhat less mobile overall during this time period as well. The daughter is primarily concerned about a possible lingering UTI (had dx of this a couple weeks ago and discharged from ED on cefuroxime) as well as medications to calm her down. She has not seen neurology in several years and has been diagnosed with dementia for many years    In ED, UA positive. Given CTX

## 2023-08-08 NOTE — H&P ADULT - PROBLEM SELECTOR PLAN 3
-quetiapine 25mg qhs for now  - c/s in AM regarding antipsychotic medication recommendations  -currently redirectable and pleasant. Not verbally or physically agitated. No indication for 1:1 at this time  -advised f/u with neurology as outpatient as it has been many years since last visit  -EKG  -dysphagia screen  -aspiration, fall precautions  -PT eval, SW c/s -quetiapine 25mg qhs for now  - c/s in AM regarding antipsychotic medication recommendations  -currently redirectable and pleasant. Not verbally or physically agitated. No indication for 1:1 at this time  -advised f/u with neurology as outpatient as it has been many years since last visit  -EKG QTc 467  -dysphagia screen  -aspiration, fall precautions  -PT eval, SW c/s

## 2023-08-08 NOTE — ED PROVIDER NOTE - ATTENDING CONTRIBUTION TO CARE
Agree with above, patient brought to hospital for increasing agitation concerning for delirium on top of dementia.  Patient is on quetiapine at home but with out good response.  Plan for infectious metabolic work-up, will likely need admission.

## 2023-08-08 NOTE — H&P ADULT - TIME BILLING
I had a face to face encounter with this patient. I spent 78 total minutes on the bedside interview and examination, phone translation, collateral from daughter, coordination of care, counseling, chart review, order placement and documentation for this patient.

## 2023-08-08 NOTE — H&P ADULT - PROBLEM SELECTOR PLAN 8
Lovenox 40mg qd  Dysphagia screen pending, If passes DASH/TLC diet. Per daughter no hx dysphagia  PT konrad, ROGER c/s, BH in AM Lovenox 40mg qd  Dysphagia screen passed - sushila S&S konrad. Per daughter no hx dysphagia  PT eval, ROGER c/s, BH in AM

## 2023-08-08 NOTE — ED PROVIDER NOTE - PROGRESS NOTE DETAILS
desire to lose weight Donald HINOJOSA: Pending UA after straight cath. Needs rehydration. Family agreed to plan for hospital adm. Hospitalist agreed for plan, requesting Ceftriaxone, UA, Urine C/S.

## 2023-08-08 NOTE — H&P ADULT - ASSESSMENT
83F with PMHx Alzheimer's dementia (many years), HTN, HLD presenting with agitation and decreased appetite from home. Likely acute encephalopathy from UTI vs worsening dementia

## 2023-08-08 NOTE — ED ADULT TRIAGE NOTE - CHIEF COMPLAINT QUOTE
Pt h/o dementia recently getting treated for uti 2 weeks ago, decreased appetite for the past week, change in behavior over the past week daughter endorses increase in agitation/anxiety.

## 2023-08-08 NOTE — ED PROVIDER NOTE - PHYSICAL EXAMINATION
PHYSICAL EXAM:  GENERAL: NAD, lying in bed comfortably  HEAD:  Atraumatic, Normocephalic  EYES: EOMI, PERRLA, conjunctiva and sclera clear  ENT: No erythema/pallor/petechiae/lesions  NECK: Supple  LUNG: CTA b/l; no r/r/w  HEART: RRR, +S1/S2; No m/r/g  ABDOMEN: soft, NT/ND; BS audible   EXTREMITIES:  2+ Peripheral Pulses, brisk cap refill. No clubbing, cyanosis, or edema  NERVOUS SYSTEM:  AAOx2, grossly moving all 4 extremities   SKIN: No rashes or lesions

## 2023-08-08 NOTE — H&P ADULT - PROBLEM SELECTOR PLAN 2
UA positive however denies sx. Given agitation and unreliable historian, will treat for UTI for now  -CTX, f/u UCx, not septic, lactate nl

## 2023-08-09 LAB
ALBUMIN SERPL ELPH-MCNC: 3.4 G/DL — SIGNIFICANT CHANGE UP (ref 3.3–5)
ALP SERPL-CCNC: 116 U/L — SIGNIFICANT CHANGE UP (ref 40–120)
ALT FLD-CCNC: 31 U/L — SIGNIFICANT CHANGE UP (ref 4–33)
ANION GAP SERPL CALC-SCNC: 10 MMOL/L — SIGNIFICANT CHANGE UP (ref 7–14)
AST SERPL-CCNC: 36 U/L — HIGH (ref 4–32)
BILIRUB SERPL-MCNC: 0.5 MG/DL — SIGNIFICANT CHANGE UP (ref 0.2–1.2)
BUN SERPL-MCNC: 24 MG/DL — HIGH (ref 7–23)
CALCIUM SERPL-MCNC: 9 MG/DL — SIGNIFICANT CHANGE UP (ref 8.4–10.5)
CHLORIDE SERPL-SCNC: 110 MMOL/L — HIGH (ref 98–107)
CK SERPL-CCNC: 689 U/L — HIGH (ref 25–170)
CO2 SERPL-SCNC: 24 MMOL/L — SIGNIFICANT CHANGE UP (ref 22–31)
CREAT SERPL-MCNC: 0.79 MG/DL — SIGNIFICANT CHANGE UP (ref 0.5–1.3)
CULTURE RESULTS: NO GROWTH — SIGNIFICANT CHANGE UP
EGFR: 74 ML/MIN/1.73M2 — SIGNIFICANT CHANGE UP
GLUCOSE SERPL-MCNC: 93 MG/DL — SIGNIFICANT CHANGE UP (ref 70–99)
HCT VFR BLD CALC: 41 % — SIGNIFICANT CHANGE UP (ref 34.5–45)
HGB BLD-MCNC: 12.8 G/DL — SIGNIFICANT CHANGE UP (ref 11.5–15.5)
MCHC RBC-ENTMCNC: 30.4 PG — SIGNIFICANT CHANGE UP (ref 27–34)
MCHC RBC-ENTMCNC: 31.2 GM/DL — LOW (ref 32–36)
MCV RBC AUTO: 97.4 FL — SIGNIFICANT CHANGE UP (ref 80–100)
NRBC # BLD: 0 /100 WBCS — SIGNIFICANT CHANGE UP (ref 0–0)
NRBC # FLD: 0 K/UL — SIGNIFICANT CHANGE UP (ref 0–0)
PLATELET # BLD AUTO: 249 K/UL — SIGNIFICANT CHANGE UP (ref 150–400)
POTASSIUM SERPL-MCNC: 4.5 MMOL/L — SIGNIFICANT CHANGE UP (ref 3.5–5.3)
POTASSIUM SERPL-SCNC: 4.5 MMOL/L — SIGNIFICANT CHANGE UP (ref 3.5–5.3)
PROT SERPL-MCNC: 6.8 G/DL — SIGNIFICANT CHANGE UP (ref 6–8.3)
RBC # BLD: 4.21 M/UL — SIGNIFICANT CHANGE UP (ref 3.8–5.2)
RBC # FLD: 12.9 % — SIGNIFICANT CHANGE UP (ref 10.3–14.5)
SODIUM SERPL-SCNC: 144 MMOL/L — SIGNIFICANT CHANGE UP (ref 135–145)
SPECIMEN SOURCE: SIGNIFICANT CHANGE UP
WBC # BLD: 6.11 K/UL — SIGNIFICANT CHANGE UP (ref 3.8–10.5)
WBC # FLD AUTO: 6.11 K/UL — SIGNIFICANT CHANGE UP (ref 3.8–10.5)

## 2023-08-09 PROCEDURE — 90792 PSYCH DIAG EVAL W/MED SRVCS: CPT

## 2023-08-09 PROCEDURE — 99232 SBSQ HOSP IP/OBS MODERATE 35: CPT

## 2023-08-09 RX ORDER — ATORVASTATIN CALCIUM 80 MG/1
1 TABLET, FILM COATED ORAL
Refills: 0 | DISCHARGE

## 2023-08-09 RX ORDER — SODIUM CHLORIDE 9 MG/ML
1000 INJECTION, SOLUTION INTRAVENOUS
Refills: 0 | Status: DISCONTINUED | OUTPATIENT
Start: 2023-08-09 | End: 2023-08-11

## 2023-08-09 RX ORDER — QUETIAPINE FUMARATE 200 MG/1
12.5 TABLET, FILM COATED ORAL EVERY 6 HOURS
Refills: 0 | Status: DISCONTINUED | OUTPATIENT
Start: 2023-08-09 | End: 2023-08-11

## 2023-08-09 RX ORDER — OLANZAPINE 15 MG/1
1.25 TABLET, FILM COATED ORAL EVERY 6 HOURS
Refills: 0 | Status: DISCONTINUED | OUTPATIENT
Start: 2023-08-09 | End: 2023-08-11

## 2023-08-09 RX ORDER — VALSARTAN 80 MG/1
1 TABLET ORAL
Refills: 0 | DISCHARGE

## 2023-08-09 RX ADMIN — Medication 1 MILLIGRAM(S): at 08:47

## 2023-08-09 RX ADMIN — CEFTRIAXONE 100 MILLIGRAM(S): 500 INJECTION, POWDER, FOR SOLUTION INTRAMUSCULAR; INTRAVENOUS at 21:53

## 2023-08-09 RX ADMIN — SODIUM CHLORIDE 75 MILLILITER(S): 9 INJECTION, SOLUTION INTRAVENOUS at 18:17

## 2023-08-09 RX ADMIN — ENOXAPARIN SODIUM 40 MILLIGRAM(S): 100 INJECTION SUBCUTANEOUS at 06:31

## 2023-08-09 RX ADMIN — SODIUM CHLORIDE 75 MILLILITER(S): 9 INJECTION, SOLUTION INTRAVENOUS at 10:16

## 2023-08-09 RX ADMIN — QUETIAPINE FUMARATE 12.5 MILLIGRAM(S): 200 TABLET, FILM COATED ORAL at 18:16

## 2023-08-09 RX ADMIN — QUETIAPINE FUMARATE 25 MILLIGRAM(S): 200 TABLET, FILM COATED ORAL at 21:52

## 2023-08-09 NOTE — ED ADULT NURSE NOTE - OBJECTIVE STATEMENT
Report received from JAVED Finley. No HPI completed prior. Night Shift RN: Pt aaox4, breathing even and unlabored in bed. Pt came in with c/o increased anxiety and agitation with dark colored urine. Pt denies any pain with urination at this time. Pt also is calm and cooperative in bed. Pt denies chest pain, SOB, dizziness, headache, blurry vision, chills. #20G IV placed in right wrist by day shift RN. Family at bedside translating as per pt request. Bed in lowest position, call bell within reach.

## 2023-08-09 NOTE — ED ADULT NURSE REASSESSMENT NOTE - NS ED NURSE REASSESS COMMENT FT1
break coverage RN: pt sleeping at this time respirations even and non labored pt appears comfortable and in no distress at this time. admitted to medicine for agitation, found to have UTI.  comfort and safety measures provided.

## 2023-08-09 NOTE — PHYSICAL THERAPY INITIAL EVALUATION ADULT - ADDITIONAL COMMENTS
History mainly obtained by daughter, pt is a poor historian. Pt lives with her  in a house with 4 steps to enter, chair lift inside. Pt uses a rolling walker for short distances only and requires assistance with ADLs prior. Pt reports 1 fall in the past 6 months, requiring assistance to stand up. Pt denies numbness and tingling in bilateral UE and LE.   Pt left semi supine in bed in NAD, all lines intact, call baron in reach and JAVED Monreal made aware.

## 2023-08-09 NOTE — BH CONSULTATION LIAISON ASSESSMENT NOTE - CURRENT MEDICATION
MEDICATIONS  (STANDING):  enoxaparin Injectable 40 milliGRAM(s) SubCutaneous every 24 hours  lactated ringers. 1000 milliLiter(s) (75 mL/Hr) IV Continuous <Continuous>  QUEtiapine 25 milliGRAM(s) Oral at bedtime    MEDICATIONS  (PRN):  acetaminophen     Tablet .. 650 milliGRAM(s) Oral every 6 hours PRN Temp greater or equal to 38C (100.4F), Mild Pain (1 - 3)  melatonin 3 milliGRAM(s) Oral at bedtime PRN Insomnia

## 2023-08-09 NOTE — BH CONSULTATION LIAISON ASSESSMENT NOTE - TIME CONSULT PERFORMED
The polyp that was removed with the colonoscopy that I did was hyperplastic.  This is benign.  I see that Dr. Stanton did a colonoscopy the next day.  The 09-Aug-2023 11:43

## 2023-08-09 NOTE — BH CONSULTATION LIAISON ASSESSMENT NOTE - HPI (INCLUDE ILLNESS QUALITY, SEVERITY, DURATION, TIMING, CONTEXT, MODIFYING FACTORS, ASSOCIATED SIGNS AND SYMPTOMS)
Patient is a  83 woman  with PMHx Alzheimer's dementia, HTN, HLD presenting with agitation and decreased appetite from home, received PRN Ativan 1mg PO this morning, consulted for delirium, agitation, worsening dementia.     Chart reviewed,  Amy ID [id number] utilized, pt. seen/evaluated in ED, calm, pleasantly confused,  AAOX1, thinks she is at home right now, reports that she lives alone, and was repeatedly asking the writer where is her mother. Patient thinks she is 38 years old. She denies SI and HI, denies avh, denies feeling unsafe, however overall unable to engage in meaningful interview.     Care coordinated with patient's nurse, who reports that patient was agitated earlier, screaming/yelling non stop and required Ativan PO PRN.

## 2023-08-09 NOTE — PHYSICAL THERAPY INITIAL EVALUATION ADULT - GENERAL OBSERVATIONS, REHAB EVAL
Pt received semi supine in bed in NAD, all lines intact + IV, HR  100, SpO2 97, pts daughter and  present at bedside throughout.

## 2023-08-09 NOTE — BH CONSULTATION LIAISON ASSESSMENT NOTE - NSBHCHARTREVIEWVS_PSY_A_CORE FT
Vital Signs Last 24 Hrs  T(C): 36.6 (09 Aug 2023 06:26), Max: 37.2 (08 Aug 2023 14:07)  T(F): 97.9 (09 Aug 2023 06:26), Max: 98.9 (08 Aug 2023 14:07)  HR: 59 (09 Aug 2023 06:26) (59 - 99)  BP: 150/87 (09 Aug 2023 06:26) (131/88 - 150/87)  BP(mean): --  RR: 17 (09 Aug 2023 06:26) (16 - 17)  SpO2: 97% (09 Aug 2023 06:26) (97% - 100%)    Parameters below as of 09 Aug 2023 06:26  Patient On (Oxygen Delivery Method): room air

## 2023-08-09 NOTE — BH CONSULTATION LIAISON ASSESSMENT NOTE - SUMMARY
Patient is a  83 woman  with PMHx Alzheimer's dementia, HTN, HLD presenting with agitation and decreased appetite from home, received PRN Ativan 1mg PO this morning, consulted fro delirium, agitation, worsening dementia.     Pt. seen in ED, pleasantly confused,  AAOX1, poor historian, has impaired attention and impoverished thought process, denies SI and HI, overall unable to engage in meaningful interview due to cognitive limitations. Concerns for delirium on top of underlying dementia    PLAN:  1- Patient will benefit from 7S transfer  2- Cont. Seroquel 25mg PO at bedtime  3-Mild agitation; Seroquel 12.5mg-25mg po q6h prn  4-Severe agitation Zyprexa 1.25mg IM q6h prn  5-HOLD antipsychotics if qtc >500  6-Treatment of UTI as per team, check TSH, B12 and Folate  7--Supportive treatment of delirium: 1) Minimize use of benzodiazepines, opioids, anticholinergics, and other deliriogenic agents whenever possible.  2) Maintain sleep wake cycle.  3) Provide frequent reorientation and redirection.  4) Family member at bedside if possible. 5) Provide corrective lenses/hearing aids if required  8- Collateral needed

## 2023-08-09 NOTE — PHYSICAL THERAPY INITIAL EVALUATION ADULT - PERTINENT HX OF CURRENT PROBLEM, REHAB EVAL
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 Patient is a limited historian and when asked why she is in the hospital with  she said "nothing special" and "brought in for problems" but she cannot further elaborate. Collateral also obtained from daughter at bedside who states she has been screaming and yelling at home but not physically agitated over the last several days. She was prescribed quetiapine and she increased dose from 25mg to up to 25mg TID over the last several days but this did not help. She is not on any other antipsychotics at this time. She also has been having decreased PO intake and dark urine. She is only drinking ensure. She lives at home with her  and uses a walker to ambulate. She is becoming somewhat less mobile overall during this time period as well. She has not seen neurology in several years and has been diagnosed with dementia for many years

## 2023-08-09 NOTE — PHARMACOTHERAPY INTERVENTION NOTE - COMMENTS
Medication list verified with outpatient pharmacy, Outpatient Medication Review ([facility]) updated accordingly.   Per pharmacy; patient is on Valsartan 80mg daily (last filled in [facility]/2023 x 90 day supply).

## 2023-08-09 NOTE — ED ADULT NURSE NOTE - NSFALLUNIVINTERV_ED_ALL_ED
Bed/Stretcher in lowest position, wheels locked, appropriate side rails in place/Call bell, personal items and telephone in reach/Instruct patient to call for assistance before getting out of bed/chair/stretcher/Non-slip footwear applied when patient is off stretcher/[place] to call system/Physically safe environment - no spills, clutter or unnecessary equipment/Purposeful proactive rounding/Room/bathroom lighting operational, light cord in reach

## 2023-08-09 NOTE — BH CONSULTATION LIAISON ASSESSMENT NOTE - RISK ASSESSMENT
Risk factors; age, h/o dementia, possible delirium  Protective factors;  supportive family no si or hi

## 2023-08-10 DIAGNOSIS — Z29.9 ENCOUNTER FOR PROPHYLACTIC MEASURES, UNSPECIFIED: ICD-10-CM

## 2023-08-10 LAB
ALBUMIN SERPL ELPH-MCNC: 3 G/DL — LOW (ref 3.3–5)
ALP SERPL-CCNC: 109 U/L — SIGNIFICANT CHANGE UP (ref 40–120)
ALT FLD-CCNC: 31 U/L — SIGNIFICANT CHANGE UP (ref 4–33)
ANION GAP SERPL CALC-SCNC: 14 MMOL/L — SIGNIFICANT CHANGE UP (ref 7–14)
AST SERPL-CCNC: 35 U/L — HIGH (ref 4–32)
BILIRUB SERPL-MCNC: 0.4 MG/DL — SIGNIFICANT CHANGE UP (ref 0.2–1.2)
BUN SERPL-MCNC: 10 MG/DL — SIGNIFICANT CHANGE UP (ref 7–23)
CALCIUM SERPL-MCNC: 9.1 MG/DL — SIGNIFICANT CHANGE UP (ref 8.4–10.5)
CHLORIDE SERPL-SCNC: 105 MMOL/L — SIGNIFICANT CHANGE UP (ref 98–107)
CK SERPL-CCNC: 442 U/L — HIGH (ref 25–170)
CO2 SERPL-SCNC: 18 MMOL/L — LOW (ref 22–31)
CREAT SERPL-MCNC: 0.49 MG/DL — LOW (ref 0.5–1.3)
EGFR: 93 ML/MIN/1.73M2 — SIGNIFICANT CHANGE UP
GLUCOSE SERPL-MCNC: 100 MG/DL — HIGH (ref 70–99)
HCT VFR BLD CALC: 43.1 % — SIGNIFICANT CHANGE UP (ref 34.5–45)
HGB BLD-MCNC: 12.6 G/DL — SIGNIFICANT CHANGE UP (ref 11.5–15.5)
MCHC RBC-ENTMCNC: 29.2 GM/DL — LOW (ref 32–36)
MCHC RBC-ENTMCNC: 30.6 PG — SIGNIFICANT CHANGE UP (ref 27–34)
MCV RBC AUTO: 104.6 FL — HIGH (ref 80–100)
NRBC # BLD: 0 /100 WBCS — SIGNIFICANT CHANGE UP (ref 0–0)
NRBC # FLD: 0 K/UL — SIGNIFICANT CHANGE UP (ref 0–0)
PLATELET # BLD AUTO: 206 K/UL — SIGNIFICANT CHANGE UP (ref 150–400)
POTASSIUM SERPL-MCNC: 4.8 MMOL/L — SIGNIFICANT CHANGE UP (ref 3.5–5.3)
POTASSIUM SERPL-SCNC: 4.8 MMOL/L — SIGNIFICANT CHANGE UP (ref 3.5–5.3)
PROT SERPL-MCNC: 6.4 G/DL — SIGNIFICANT CHANGE UP (ref 6–8.3)
RBC # BLD: 4.12 M/UL — SIGNIFICANT CHANGE UP (ref 3.8–5.2)
RBC # FLD: 12.5 % — SIGNIFICANT CHANGE UP (ref 10.3–14.5)
SODIUM SERPL-SCNC: 137 MMOL/L — SIGNIFICANT CHANGE UP (ref 135–145)
T3 SERPL-MCNC: 114 NG/DL — SIGNIFICANT CHANGE UP (ref 80–200)
T4 AB SER-ACNC: 8.48 UG/DL — SIGNIFICANT CHANGE UP (ref 5.1–13)
TSH SERPL-MCNC: 1.83 UIU/ML — SIGNIFICANT CHANGE UP (ref 0.27–4.2)
WBC # BLD: 4.95 K/UL — SIGNIFICANT CHANGE UP (ref 3.8–10.5)
WBC # FLD AUTO: 4.95 K/UL — SIGNIFICANT CHANGE UP (ref 3.8–10.5)

## 2023-08-10 PROCEDURE — 99232 SBSQ HOSP IP/OBS MODERATE 35: CPT

## 2023-08-10 RX ADMIN — ENOXAPARIN SODIUM 40 MILLIGRAM(S): 100 INJECTION SUBCUTANEOUS at 06:00

## 2023-08-10 RX ADMIN — Medication 650 MILLIGRAM(S): at 05:57

## 2023-08-10 RX ADMIN — QUETIAPINE FUMARATE 25 MILLIGRAM(S): 200 TABLET, FILM COATED ORAL at 21:16

## 2023-08-10 RX ADMIN — Medication 650 MILLIGRAM(S): at 06:19

## 2023-08-10 RX ADMIN — CEFTRIAXONE 100 MILLIGRAM(S): 500 INJECTION, POWDER, FOR SOLUTION INTRAMUSCULAR; INTRAVENOUS at 21:40

## 2023-08-10 RX ADMIN — SODIUM CHLORIDE 75 MILLILITER(S): 9 INJECTION, SOLUTION INTRAVENOUS at 06:27

## 2023-08-10 RX ADMIN — Medication 3 MILLIGRAM(S): at 21:18

## 2023-08-10 RX ADMIN — QUETIAPINE FUMARATE 12.5 MILLIGRAM(S): 200 TABLET, FILM COATED ORAL at 05:58

## 2023-08-10 NOTE — DIETITIAN INITIAL EVALUATION ADULT - OTHER INFO
Pt 82 yo female with PMHx Alzheimer's dementia, HTN, HLD presented with agitation and decreased appetite from home - per chart review.     At time of visit, Pt awake; Pt speaks Lao.  phone service used (ID# [id number]). Pt eats well reported. Pt needs to be fed per MHW (Mental health worker). No report of chewing/swallowing difficulty. But Pt on Pureed diet at present. Speech Bedside Swallow Evaluation pending. No report of nausea, vomiting or diarrhea @ this time. +BM (8/9) per flow sheet.     Pt could not confirm her height, Pt's body weight: ~140#. No report of weight loss or weight changes PTA. Of note, Pt's weighs: 60 kg (8/8/23), 59 kg (HIE - 7/19/23). No other food elated concern voiced at present. RD remains available, Pt made aware.

## 2023-08-10 NOTE — PROGRESS NOTE ADULT - PROBLEM SELECTOR PLAN 6
DVT ppx: lovenox   dispo: will discuss alternative options for delirium with psych. Plan for home PT. If stable, discharge tomorrow

## 2023-08-10 NOTE — PATIENT PROFILE ADULT - FALL HARM RISK - HARM RISK INTERVENTIONS
Assistance OOB with selected safe patient handling equipment/Communicate Risk of Fall with Harm to all staff/Reinforce activity limits and safety measures with patient and family/Reorient to person, place and time as needed/Review medications for side effects contributing to fall risk/Sit up slowly, dangle for a short time, stand at bedside before walking/Tailored Fall Risk Interventions/Toileting schedule using arm’s reach rule for commode and bathroom/Use of alarms - bed, chair and/or voice tab/Visual Cue: Yellow wristband and red socks/Bed in lowest position, wheels locked, appropriate side rails in place/Call bell, personal items and telephone in reach/Instruct patient to call for assistance before getting out of bed or chair/Non-slip footwear when patient is out of bed/[place] to call system/Physically safe environment - no spills, clutter or unnecessary equipment/Purposeful Proactive Rounding/Room/bathroom lighting operational, light cord in reach

## 2023-08-10 NOTE — PROGRESS NOTE ADULT - PROBLEM SELECTOR PLAN 1
with behavior disturbance d/t acute UTI and underlying Alzheimer's dementia  - appreciate psych consult  - Seroquel qHS  - treat underlying infectious process  - Check TFT
with behavior disturbance d/t acute UTI and underlying Alzheimer's dementia  - appreciate psych consult  - Seroquel qHS  - Mild agitation; Seroquel 12.5mg-25mg po q6h prn  - Severe agitation Zyprexa 1.25mg IM q6h prn  - treat underlying infectious process  - TSH WNL

## 2023-08-10 NOTE — DIETITIAN INITIAL EVALUATION ADULT - ADD RECOMMEND
1. Conduct Speech Bedside Swallow Evaluation as ordered; PO diet & fluid consistency per SLP recommendation;   2. Assist Pt with meals as needed; Monitor PO diet tolerance; Honor food preferences;  3. Add Multivitamins 1 tab daily for micronutrient coverage;   4. Monitor labs, hydration status;

## 2023-08-10 NOTE — PROGRESS NOTE ADULT - PROBLEM SELECTOR PLAN 4
Favor conservative management.  - continue to monitor BP
Favor conservative management.  - continue to monitor BP

## 2023-08-10 NOTE — PROGRESS NOTE ADULT - SUBJECTIVE AND OBJECTIVE BOX
Nataliya Dutton MD  [facility] Division of [facility] Medicine  Pager: [phone]  Available via MS Teams    SUBJECTIVE / OVERNIGHT EVENTS:    Denies to have any complaints   Knows her name, daughters name, and . Does not know year and states she is at home     MEDICATIONS  (STANDING):  cefTRIAXone   IVPB 1000 milliGRAM(s) IV Intermittent every 24 hours  enoxaparin Injectable 40 milliGRAM(s) SubCutaneous every 24 hours  lactated ringers. 1000 milliLiter(s) (75 mL/Hr) IV Continuous <Continuous>  multivitamin 1 Tablet(s) Oral daily  QUEtiapine 25 milliGRAM(s) Oral at bedtime    MEDICATIONS  (PRN):  acetaminophen     Tablet .. 650 milliGRAM(s) Oral every 6 hours PRN Temp greater or equal to 38C (100.4F), Mild Pain (1 - 3)  melatonin 3 milliGRAM(s) Oral at bedtime PRN Insomnia  OLANZapine Injectable 1.25 milliGRAM(s) IntraMuscular every 6 hours PRN severe agitation  QUEtiapine 12.5 milliGRAM(s) Oral every 6 hours PRN agitation      I&O's Summary      PHYSICAL EXAM:  Vital Signs Last 24 Hrs  T(C): 36.9 (10 Aug 2023 12:06), Max: 36.9 (09 Aug 2023 20:05)  T(F): 98.4 (10 Aug 2023 12:06), Max: 98.4 (09 Aug 2023 20:05)  HR: 77 (10 Aug 2023 12:06) (77 - 107)  BP: 131/58 (10 Aug 2023 12:06) (131/58 - 159/98)  BP(mean): 132 (10 Aug 2023 05:59) (132 - 132)  RR: 18 (10 Aug 2023 12:06) (18 - 18)  SpO2: 99% (10 Aug 2023 12:06) (97% - 99%)    Parameters below as of 10 Aug 2023 12:06  Patient On (Oxygen Delivery Method): room air      CONSTITUTIONAL: NAD, well-developed   EYES: conjunctiva and sclera clear  ENMT: Moist oral mucosa   NECK: Supple   RESPIRATORY: Normal respiratory effort; lungs are clear to auscultation bilaterally  CARDIOVASCULAR: Regular rate and rhythm, normal S1 and S2, no murmur/rub/gallop; Peripheral pulses are 2+ bilaterally  ABDOMEN: Nontender to palpation, normoactive bowel sounds, no rebound/guarding   MUSCULOSKELETAL: No lower extremity edema   PSYCH: Alert, Knows her name, daughters name, and . Does not know year and states she is at home   NEUROLOGY: no gross sensory or motor deficits   SKIN: No rashes    LABS:                        12.6   4.95  )-----------( 206      ( 10 Aug 2023 08:05 )             43.1     08-10    137  |  105  |  10  ----------------------------<  100<H>  4.8   |  18<L>  |  0.49<L>    Ca    9.1      10 Aug 2023 08:05  Mg     2.60     08-08    TPro  6.4  /  Alb  3.0<L>  /  TBili  0.4  /  DBili  x   /  AST  35<H>  /  ALT  31  /  AlkPhos  109  08-10      CARDIAC MARKERS ( 10 Aug 2023 08:05 )  x     / x     / 442 U/L / x     / x      CARDIAC MARKERS ( 09 Aug 2023 06:19 )  x     / x     / 689 U/L / x     / x      CARDIAC MARKERS ( 08 Aug 2023 16:14 )  x     / x     / 692 U/L / x     / x          Urinalysis Basic - ( 10 Aug 2023 08:05 )    Color: x / Appearance: x / SG: x / pH: x  Gluc: 100 mg/dL / Ketone: x  / Bili: x / Urobili: x   Blood: x / Protein: x / Nitrite: x   Leuk Esterase: x / RBC: x / WBC x   Sq Epi: x / Non Sq Epi: x / Bacteria: x        Culture - Urine (collected 08 Aug 2023 19:30)  Source: Catheterized Catheterized  Final Report (09 Aug 2023 18:36):    No growth          RADIOLOGY & ADDITIONAL TESTS:  Other Results Reviewed Today: BMP with stable Cr, CBC with stable Hg     COORDINATION OF CARE:  Communication: discussed plan of care with ACP 
[facility] Division of Hospital Medicine  Larry Huizar MD  Pager (M-F, 8A-5P): [phone]  Other Times:  [phone]    Patient is a 83y old  Female who presents with a chief complaint of agitation, decreased appetite (08 Aug 2023 21:00)    SUBJECTIVE / OVERNIGHT EVENTS:  Patient unable to provide history and participate in physical d/t her dementia. No overnight issues with F/C, vomiting, SOB, Cough, diarrhea.    MEDICATIONS  (STANDING):  enoxaparin Injectable 40 milliGRAM(s) SubCutaneous every 24 hours  lactated ringers. 1000 milliLiter(s) (75 mL/Hr) IV Continuous <Continuous>  QUEtiapine 25 milliGRAM(s) Oral at bedtime    MEDICATIONS  (PRN):  acetaminophen     Tablet .. 650 milliGRAM(s) Oral every 6 hours PRN Temp greater or equal to 38C (100.4F), Mild Pain (1 - 3)  melatonin 3 milliGRAM(s) Oral at bedtime PRN Insomnia      Vital Signs Last 24 Hrs  T(C): 36.6 (09 Aug 2023 06:26), Max: 37.2 (08 Aug 2023 14:07)  T(F): 97.9 (09 Aug 2023 06:26), Max: 98.9 (08 Aug 2023 14:07)  HR: 59 (09 Aug 2023 06:26) (59 - 99)  BP: 150/87 (09 Aug 2023 06:26) (131/88 - 150/87)  BP(mean): --  RR: 17 (09 Aug 2023 06:26) (16 - 17)  SpO2: 97% (09 Aug 2023 06:26) (97% - 100%)    Parameters below as of 09 Aug 2023 06:26  Patient On (Oxygen Delivery Method): room air      CAPILLARY BLOOD GLUCOSE        I&O's Summary      PHYSICAL EXAM:  CONSTITUTIONAL: NAD  EYES: PERRLA; conjunctiva and sclera clear  ENMT: Moist oral mucosa, no pharyngeal injection or exudates; normal dentition  NECK: Supple, no palpable masses; no thyromegaly  RESPIRATORY: Normal respiratory effort; lungs are clear to auscultation bilaterally  CARDIOVASCULAR: Regular rate and rhythm, normal S1 and S2, no murmur/rub/gallop; No lower extremity edema; Peripheral pulses are 2+ bilaterally  ABDOMEN: Nontender to palpation, normoactive bowel sounds, no rebound/guarding; No hepatosplenomegaly  MUSCULOSKELETAL:  Did not assess gait; no clubbing or cyanosis of digits; no joint swelling or tenderness to palpation  PSYCH: A+O to person; calm  NEUROLOGY: Unable to follow directions for neurological exam  SKIN: No rashes; no palpable lesions    LABS:                        12.8   6.11  )-----------( 249      ( 09 Aug 2023 06:19 )             41.0     08-09    144  |  110<H>  |  24<H>  ----------------------------<  93  4.5   |  24  |  0.79    Ca    9.0      09 Aug 2023 06:19  Mg     2.60     08-08    TPro  6.8  /  Alb  3.4  /  TBili  0.5  /  DBili  x   /  AST  36<H>  /  ALT  31  /  AlkPhos  116  08-09      CARDIAC MARKERS ( 09 Aug 2023 06:19 )  x     / x     / 689 U/L / x     / x      CARDIAC MARKERS ( 08 Aug 2023 16:14 )  x     / x     / 692 U/L / x     / x          Urinalysis Basic - ( 09 Aug 2023 06:19 )    Color: x / Appearance: x / SG: x / pH: x  Gluc: 93 mg/dL / Ketone: x  / Bili: x / Urobili: x   Blood: x / Protein: x / Nitrite: x   Leuk Esterase: x / RBC: x / WBC x   Sq Epi: x / Non Sq Epi: x / Bacteria: x        RADIOLOGY & ADDITIONAL TESTS:    Imaging Personally Reviewed:    Care Discussed with Consultants/Other Providers:

## 2023-08-10 NOTE — PROGRESS NOTE ADULT - ASSESSMENT
83F Alzheimer's dementia, HTN, p/w acute UTI with acute encephalopathy with behavior disturbance c/b rhabdomyolysis
83F Alzheimer's dementia, HTN, p/w acute UTI with acute encephalopathy with behavior disturbance c/b rhadomyolysis.

## 2023-08-10 NOTE — PROGRESS NOTE ADULT - PROBLEM SELECTOR PLAN 2
Question of failed outpt treatment from 2 weeks ago.  - CTX IV (8/8-)  - F/U UCx and sensitivity
Question of failed outpt treatment from 2 weeks ago  - CTX IV (last dose on 8/10)   - UCx NGTD

## 2023-08-11 ENCOUNTER — TRANSCRIPTION ENCOUNTER (OUTPATIENT)
Age: 83
End: 2023-08-11

## 2023-08-11 VITALS
HEART RATE: 85 BPM | SYSTOLIC BLOOD PRESSURE: 143 MMHG | RESPIRATION RATE: 17 BRPM | OXYGEN SATURATION: 96 % | TEMPERATURE: 98 F | DIASTOLIC BLOOD PRESSURE: 88 MMHG

## 2023-08-11 DIAGNOSIS — G30.9 ALZHEIMER'S DISEASE, UNSPECIFIED: ICD-10-CM

## 2023-08-11 LAB
ANION GAP SERPL CALC-SCNC: 11 MMOL/L — SIGNIFICANT CHANGE UP (ref 7–14)
BUN SERPL-MCNC: 10 MG/DL — SIGNIFICANT CHANGE UP (ref 7–23)
CALCIUM SERPL-MCNC: 9.5 MG/DL — SIGNIFICANT CHANGE UP (ref 8.4–10.5)
CHLORIDE SERPL-SCNC: 101 MMOL/L — SIGNIFICANT CHANGE UP (ref 98–107)
CO2 SERPL-SCNC: 24 MMOL/L — SIGNIFICANT CHANGE UP (ref 22–31)
CREAT SERPL-MCNC: 0.6 MG/DL — SIGNIFICANT CHANGE UP (ref 0.5–1.3)
EGFR: 88 ML/MIN/1.73M2 — SIGNIFICANT CHANGE UP
GLUCOSE SERPL-MCNC: 117 MG/DL — HIGH (ref 70–99)
HCT VFR BLD CALC: 44.9 % — SIGNIFICANT CHANGE UP (ref 34.5–45)
HGB BLD-MCNC: 14.5 G/DL — SIGNIFICANT CHANGE UP (ref 11.5–15.5)
MAGNESIUM SERPL-MCNC: 2.2 MG/DL — SIGNIFICANT CHANGE UP (ref 1.6–2.6)
MCHC RBC-ENTMCNC: 30.3 PG — SIGNIFICANT CHANGE UP (ref 27–34)
MCHC RBC-ENTMCNC: 32.3 GM/DL — SIGNIFICANT CHANGE UP (ref 32–36)
MCV RBC AUTO: 93.9 FL — SIGNIFICANT CHANGE UP (ref 80–100)
NRBC # BLD: 0 /100 WBCS — SIGNIFICANT CHANGE UP (ref 0–0)
NRBC # FLD: 0 K/UL — SIGNIFICANT CHANGE UP (ref 0–0)
PHOSPHATE SERPL-MCNC: 3.3 MG/DL — SIGNIFICANT CHANGE UP (ref 2.5–4.5)
PLATELET # BLD AUTO: 245 K/UL — SIGNIFICANT CHANGE UP (ref 150–400)
POTASSIUM SERPL-MCNC: 4.7 MMOL/L — SIGNIFICANT CHANGE UP (ref 3.5–5.3)
POTASSIUM SERPL-SCNC: 4.7 MMOL/L — SIGNIFICANT CHANGE UP (ref 3.5–5.3)
RBC # BLD: 4.78 M/UL — SIGNIFICANT CHANGE UP (ref 3.8–5.2)
RBC # FLD: 12.6 % — SIGNIFICANT CHANGE UP (ref 10.3–14.5)
SODIUM SERPL-SCNC: 136 MMOL/L — SIGNIFICANT CHANGE UP (ref 135–145)
WBC # BLD: 5.84 K/UL — SIGNIFICANT CHANGE UP (ref 3.8–10.5)
WBC # FLD AUTO: 5.84 K/UL — SIGNIFICANT CHANGE UP (ref 3.8–10.5)

## 2023-08-11 PROCEDURE — 99239 HOSP IP/OBS DSCHRG MGMT >30: CPT

## 2023-08-11 PROCEDURE — 99231 SBSQ HOSP IP/OBS SF/LOW 25: CPT

## 2023-08-11 RX ORDER — LANOLIN ALCOHOL/MO/W.PET/CERES
1 CREAM (GRAM) TOPICAL
Qty: 0 | Refills: 0 | DISCHARGE
Start: 2023-08-11

## 2023-08-11 RX ORDER — QUETIAPINE FUMARATE 200 MG/1
1 TABLET, FILM COATED ORAL
Qty: 0 | Refills: 0 | DISCHARGE

## 2023-08-11 RX ORDER — QUETIAPINE FUMARATE 200 MG/1
1 TABLET, FILM COATED ORAL
Qty: 14 | Refills: 0
Start: 2023-08-11 | End: 2023-08-24

## 2023-08-11 RX ADMIN — Medication 1 TABLET(S): at 12:13

## 2023-08-11 RX ADMIN — ENOXAPARIN SODIUM 40 MILLIGRAM(S): 100 INJECTION SUBCUTANEOUS at 06:50

## 2023-08-11 RX ADMIN — SODIUM CHLORIDE 75 MILLILITER(S): 9 INJECTION, SOLUTION INTRAVENOUS at 03:09

## 2023-08-11 NOTE — DISCHARGE NOTE PROVIDER - NSFOLLOWUPCLINICS_GEN_ALL_ED_FT
[facility]  Psychiatry  [address] [place] [address]  Phone: [phone]  Fax:

## 2023-08-11 NOTE — DISCHARGE NOTE PROVIDER - ATTENDING DISCHARGE PHYSICAL EXAMINATION:
Constitutional: NAD, elderly   Head: NC/AT  Eyes: anicteric sclera  Respiratory: clear to ascultation b/l, No wheezing or rhonchi, no retractions   Cardiac: +S1/S2; RRR; no M/R/G  Gastrointestinal: abdomen soft, non-distended, no rebound or guarding; +BSx4  Extremities: no peripheral edema  Musculoskeletal: NROM x4; no joint swelling, tenderness or erythema  Vascular: 2+ radial, DP pulses B/L  Neurologic: Alert, not orientated. Does know her name and daughters name. no gross focal deficits  Psychiatric: affect and characteristics of appearance, verbalizations, behaviors are appropriate

## 2023-08-11 NOTE — DISCHARGE NOTE PROVIDER - NSDCCPCAREPLAN_GEN_ALL_CORE_FT
PRINCIPAL DISCHARGE DIAGNOSIS  Diagnosis: Agitation  Assessment and Plan of Treatment: - you were found not to be like your normal self. This is probably from a UTI you had   - we treated your UTI and you returned back to baseline  - please continue to take seroquel 25 mg every night.   - you can also use seroquel 25 mg every 6 hours AS NEEDED for agitation   - please see the [facility] and your PCP for further management

## 2023-08-11 NOTE — DISCHARGE NOTE PROVIDER - NSDCMRMEDTOKEN_GEN_ALL_CORE_FT
atorvastatin 40 mg oral tablet: 1 tab(s) orally once a day (Filled July/2023 x 90 days).  QUEtiapine 25 mg oral tablet: 1 tab(s) orally once a day (at bedtime) (NOTE: Pharmacy filled as 1/2 tablet (12.5mg) once at bedtime 8/1/23 )  valsartan 80 mg oral tablet: 1 tab(s) orally once a day (Filled Nano/2023 x 90 days).   atorvastatin 40 mg oral tablet: 1 tab(s) orally once a day (Filled July/2023 x 90 days).  Outpatient physical therapy: outpatient physical therapy 2-3x a week  QUEtiapine 25 mg oral tablet: 1 tab(s) orally once a day (at bedtime) (NOTE: Pharmacy filled as 1/2 tablet (12.5mg) once at bedtime 8/1/23 )  valsartan 80 mg oral tablet: 1 tab(s) orally once a day (Filled Nano/2023 x 90 days).   atorvastatin 40 mg oral tablet: 1 tab(s) orally once a day (Filled July/2023 x 90 days).  melatonin 3 mg oral tablet: 1 tab(s) orally once a day (at bedtime) As needed Insomnia  Multiple Vitamins oral tablet: 1 tab(s) orally once a day  Outpatient physical therapy: outpatient physical therapy 2-3x a week  QUEtiapine 25 mg oral tablet: 1 tab(s) orally once a day (at bedtime)  valsartan 80 mg oral tablet: 1 tab(s) orally once a day (Filled Nano/2023 x 90 days).   atorvastatin 40 mg oral tablet: 1 tab(s) orally once a day (Filled July/2023 x 90 days).  melatonin 3 mg oral tablet: 1 tab(s) orally once a day (at bedtime) As needed Insomnia  Multiple Vitamins oral tablet: 1 tab(s) orally once a day  Outpatient physical therapy: outpatient physical therapy 2-3x a week  QUEtiapine 25 mg oral tablet: 1 tab(s) orally once a day (at bedtime) take one tab a day at bedtime  If needed can take one pill every 6 hours for agitation  valsartan 80 mg oral tablet: 1 tab(s) orally once a day (Filled Nano/2023 x 90 days).

## 2023-08-11 NOTE — SWALLOW BEDSIDE ASSESSMENT ADULT - SWALLOW EVAL: DIAGNOSIS
1. Functional oral phase for puree, soft and bite sized solids, regular solids, mildly-thick and thin liquids marked by adequate acceptance and containment, slow yet functional mastication of solids, adequate oral transit and adequate oral clearance. 2. Functional pharyngeal phase for aforementioned consistencies marked by hyolaryngeal excursion present upon palpation. 1x cough noted on thin liquids, however not reduplicated over 4oz. No further overt s/s of penetration/aspiration evidenced across trials.

## 2023-08-11 NOTE — BH CONSULTATION LIAISON PROGRESS NOTE - NSBHFUPINTERVALCCFT_PSY_A_CORE
no behavioral issues since admission to [address] [place] [address]  no prn needs  case discussed with [address] [place] [address] team-- below plan discussed  case discussed with previous cl team
Statement Selected

## 2023-08-11 NOTE — DISCHARGE NOTE PROVIDER - CARE PROVIDER_API CALL
Message sent via My Chart.    Kenneth Guzmán MD  [address] [address]  
Letty Warren  Internal Medicine  [address] [facility], [address]  [place] [address]  Phone: [phone]  Fax: [phone]  Established Patient  Follow Up Time:

## 2023-08-11 NOTE — DISCHARGE NOTE PROVIDER - HOSPITAL COURSE
83F Alzheimer's dementia, HTN, p/w acute UTI with acute encephalopathy with behavior disturbance c/b rhabdomyolysis    #Acute encephalopathy.   - with behavior disturbance d/t acute UTI and underlying Alzheimer's dementia  - appreciate psych consult  - Seroquel 25 mg qHS, with 25 mg q6 as needed for agitation   - patient's daughter given information for geriatric psych, plan for OP follow up     #Acute UTI  - s/p 3 day course of CTX   - UCx NGTD  - treated     #Dementia.   - due to Alzheimer's disease with behavior disturbance at home.  - encourage redirection.  - patient's daughter given information for geriatric psych, plan for OP follow up     #HTN (hypertension).   - Favor conservative management.  - continue to monitor BP.    #Rhabdomyolysis.   - CK improved with fluids     Medically stable for discharge

## 2023-08-11 NOTE — BH CONSULTATION LIAISON PROGRESS NOTE - CURRENT MEDICATION
MEDICATIONS  (STANDING):  cefTRIAXone   IVPB 1000 milliGRAM(s) IV Intermittent every 24 hours  enoxaparin Injectable 40 milliGRAM(s) SubCutaneous every 24 hours  lactated ringers. 1000 milliLiter(s) (75 mL/Hr) IV Continuous <Continuous>  multivitamin 1 Tablet(s) Oral daily  QUEtiapine 25 milliGRAM(s) Oral at bedtime    MEDICATIONS  (PRN):  acetaminophen     Tablet .. 650 milliGRAM(s) Oral every 6 hours PRN Temp greater or equal to 38C (100.4F), Mild Pain (1 - 3)  melatonin 3 milliGRAM(s) Oral at bedtime PRN Insomnia  OLANZapine Injectable 1.25 milliGRAM(s) IntraMuscular every 6 hours PRN severe agitation  QUEtiapine 12.5 milliGRAM(s) Oral every 6 hours PRN agitation

## 2023-08-11 NOTE — DISCHARGE NOTE PROVIDER - NSDCFUSCHEDAPPT_GEN_ALL_CORE_FT
Letty Warren Physician [facility]  GERIATRICS [address] [place] [address]   Scheduled Appointment: 08/15/2023

## 2023-08-11 NOTE — SWALLOW BEDSIDE ASSESSMENT ADULT - COMMENTS
As per Hospitalist note dated 8/10/23 "83F Alzheimer's dementia, HTN, p/w acute UTI with acute encephalopathy with behavior disturbance c/b rhabdomyolysis"    CXR 8/8/23 "IMPRESSION: Clear lungs."    Patient visited at bedside for clinical swallow evaluation. [employer]  #[id number] (Jeff) utilized in patient's primary language of Urdu. Patient presents as awake and alert, able to follow 1-step directions and make basic wants/needs known. Patient denies difficulty swallowing.

## 2023-08-11 NOTE — SWALLOW BEDSIDE ASSESSMENT ADULT - PHARYNGEAL PHASE
Within functional limits 1x cough noted with thin liquids, however not reduplicated over 4oz/Within functional limits

## 2023-08-11 NOTE — BH CONSULTATION LIAISON PROGRESS NOTE - NSBHFUPINTERVALHXFT_PSY_A_CORE
Met with the patient.  used [id number]. Patient noted to be disoriented, confused, and could not give me factual information. When asked about her children, she states- ' Oh they are all young, maybe 5 years old'. No evident mood symptoms, not agitated or aggressive, no si or hi, no psychosis.   Resting hand tremors noted, no cogwheeling or rigidity.

## 2023-08-11 NOTE — BH CONSULTATION LIAISON PROGRESS NOTE - NSBHCHARTREVIEWLAB_PSY_A_CORE FT
CBC Full  -  ( 11 Aug 2023 10:00 )  WBC Count : 5.84 K/uL  RBC Count : 4.78 M/uL  Hemoglobin : 14.5 g/dL  Hematocrit : 44.9 %  Platelet Count - Automated : 245 K/uL  Mean Cell Volume : 93.9 fL  Mean Cell Hemoglobin : 30.3 pg  Mean Cell Hemoglobin Concentration : 32.3 gm/dL  Auto Neutrophil # : x  Auto Lymphocyte # : x  Auto Monocyte # : x  Auto Eosinophil # : x  Auto Basophil # : x  Auto Neutrophil % : x  Auto Lymphocyte % : x  Auto Monocyte % : x  Auto Eosinophil % : x  Auto Basophil % : x  08-11    136  |  101  |  10  ----------------------------<  117<H>  4.7   |  24  |  0.60    Ca    9.5      11 Aug 2023 10:00  Phos  3.3     08-11  Mg     2.20     08-11    TPro  6.4  /  Alb  3.0<L>  /  TBili  0.4  /  DBili  x   /  AST  35<H>  /  ALT  31  /  AlkPhos  109  08-10

## 2023-08-11 NOTE — BH CONSULTATION LIAISON PROGRESS NOTE - NSBHCHARTREVIEWVS_PSY_A_CORE FT
Vital Signs Last 24 Hrs  T(C): 36.7 (11 Aug 2023 12:04), Max: 36.8 (11 Aug 2023 05:00)  T(F): 98.1 (11 Aug 2023 12:04), Max: 98.2 (11 Aug 2023 05:00)  HR: 85 (11 Aug 2023 12:04) (84 - 95)  BP: 143/88 (11 Aug 2023 12:04) (123/68 - 143/88)  BP(mean): --  RR: 17 (11 Aug 2023 12:04) (17 - 18)  SpO2: 96% (11 Aug 2023 12:04) (96% - 98%)    Parameters below as of 11 Aug 2023 12:04  Patient On (Oxygen Delivery Method): room air

## 2023-08-11 NOTE — DISCHARGE NOTE NURSING/CASE MANAGEMENT/SOCIAL WORK - PATIENT PORTAL LINK FT
You can access the FollowMyHealth Patient Portal offered by [facility] by registering at the following website: [email or web address]. By joining [employer]’s FollowMyHealth portal, you will also be able to view your health information using other applications (apps) compatible with our system.

## 2023-08-15 ENCOUNTER — APPOINTMENT (OUTPATIENT)
Dept: GERIATRICS | Facility: CLINIC | Age: 83
End: 2023-08-15
Payer: MEDICARE

## 2023-08-15 DIAGNOSIS — I10 ESSENTIAL (PRIMARY) HYPERTENSION: ICD-10-CM

## 2023-08-15 DIAGNOSIS — M25.562 PAIN IN RIGHT KNEE: ICD-10-CM

## 2023-08-15 DIAGNOSIS — R79.89 OTHER SPECIFIED ABNORMAL FINDINGS OF BLOOD CHEMISTRY: ICD-10-CM

## 2023-08-15 DIAGNOSIS — M25.561 PAIN IN RIGHT KNEE: ICD-10-CM

## 2023-08-15 PROCEDURE — 99214 OFFICE O/P EST MOD 30 MIN: CPT | Mod: 95

## 2023-08-15 RX ORDER — ATORVASTATIN CALCIUM 40 MG/1
40 TABLET, FILM COATED ORAL
Qty: 90 | Refills: 3 | Status: ACTIVE | COMMUNITY
Start: 2021-07-31 | End: 1900-01-01

## 2023-08-15 NOTE — PHYSICAL EXAM
[Alert] : alert [Well Nourished] : well nourished [Well Developed] : well developed [Sclera] : the sclera and conjunctiva were normal [EOMI] : extraocular movements were intact [PERRL] : pupils were equal in size, round, and reactive to light [Normal Oral Mucosa] : normal oral mucosa [No Oral Pallor] : no oral pallor [Normal Outer Ear/Nose] : the ears and nose were normal in appearance [Normal Hearing] : hearing was normal [Both Tympanic Membranes Were Examined] : both tympanic membranes were normal [Normal Appearance] : the appearance of the neck was normal [Supple] : the neck was supple [No Respiratory Distress] : no respiratory distress [No Acc Muscle Use] : no accessory muscle use [Respiration, Rhythm And Depth] : normal respiratory rhythm and effort [Auscultation Breath Sounds / Voice Sounds] : lungs were clear to auscultation bilaterally [Normal PMI] : the apical impulse was abnormal [Normal S1, S2] : normal S1 and S2 [Heart Rate And Rhythm] : heart rate was normal and rhythm regular [Bowel Sounds] : normal bowel sounds [Abdomen Tenderness] : non-tender [Abdomen Soft] : soft [No CVA Tenderness] : no CVA  tenderness [No Spinal Tenderness] : no spinal tenderness [Normal Gait] : abnormal gait [No Clubbing, Cyanosis] : no clubbing or cyanosis of the fingernails [Motor Tone] : muscle strength and tone were normal [Normal Color / Pigmentation] : normal skin color and pigmentation [] : no rash [Normal Turgor] : normal skin turgor [Skin Lesions] : no skin lesions [Normal Affect] : the affect was normal [Normal Mood] : the mood was normal [de-identified] : slow gait  [de-identified] : AO x 2 (person and place)

## 2023-08-15 NOTE — HISTORY OF PRESENT ILLNESS
[FreeTextEntry1] : Mrs. Rivera is a 84 yo F coming from home lives with her  Kenny. Pt seen in the office with her son via telehealth.  Ambulates with a cane. Has Hx of mixed dementia (AD, vascular and Parkinsons?), HLD, HTN, bilateral knee OA and low vitamin D.  Pt last seen in the office back in March 2022, had worsening agitation and daughter called office to have medications adjusted. Seroquel dose was increased from 12.5mg to 25mg at HS. She was advised to bring the pt to the office to rule out other causes. Appointment was never made and pt was taken to the ER and was treated for a UTI with course of Cefuroxime. She had worsening behavior and was admitted to [facility] and treated with IV abx, she was also seen by psychiatry and was advised to follow up as outpatient.   Her HHA reported pt has been mostly bed-bound for the past couple of months and is minimally verbal. She asks for help most of the night and complains of generalized pain.   She also remains resistant to taking a shower and sometimes get verbally and physically violent with HHA and . She also has new stool and urine incontinence and wears diapers now.   Her  assists her with cooking, housekeeping, taking her medications. Her daughter takes her to doctors appointments. Zeynep is with her from 4pm-9pm daily.   No recent falls reported, she ambulates with a cane. She complains of bilateral knee pain was told 2/2 OA and that she will need a knee replacement but her daughter opted for conservative management. She has tried topical injections with partial relief. She reported will be meeting with Orthopedics today.   Advance directives not filled out.  [No falls in past year] : Patient reported no falls in the past year [0] : 2) Feeling down, depressed, or hopeless: Not at all (0)

## 2023-08-15 NOTE — ASSESSMENT
[FreeTextEntry1] : - discussed recommendation to bring pt in person to the office for follow up and repeat blood work - unsure who has been refilling medications since pt was seen 1 year ago, unsure if pt taking  medications as HHA and son reported unable to read label. Instructed will give new refills and to discard old medications.

## 2023-08-15 NOTE — DATA REVIEWED
[FreeTextEntry1] :    MR Head No Cont             Final  No Documents Attached       EXAM:  MR BRAIN   PROCEDURE DATE:  11/29/2020    INTERPRETATION:  MR brain  without gadolinium  CLINICAL INFORMATION: Mild cognitive impairment  TECHNIQUE:   Sagittal and axial T1-weighted images, axial FLAIR images, axial gradient echo and T2-weighted images and axial diffusion weighted images of the brain were obtained.  FINDINGS:   No prior similar studies are available for review.  The brain demonstrates moderate periventricular, deep and subcortical white matter ischemia.   No acute cerebral cortical infarct is found.   No intracranial hemorrhage is recognized. No mass effect is found in the brain.  The ventricles, sulci and basal cisterns show mild atrophy.  The vertebral and internal carotid arteries demonstrate expected flow voids indicating their patency.  The orbits are unremarkable.  The paranasal sinuses are clear. Mild LEFT mastoiditis is noted. The nasal cavity appears intact.  The nasopharynx is symmetric.  The central skull base and temporal bones are intact.  The calvarium appears unremarkable.  IMPRESSION:   moderate periventricular, deep and subcortical white matter ischemia. Mild atrophy. Mild LEFT mastoiditis.       JORJE LOMAS MD; Attending Radiologist This document has been electronically signed. Nov 29 2020  3:34PM      Ordered by: DANTE TORRES       Collected/Examined: 29Nov2020 12:07PM        Verified by: DANTE TORRES 29Nov2020 09:50PM         Result Communication: No patient communication needed at this time; Stage: Final         Performed at: [facility] [place]       Resulted: 29Nov2020 03:37PM       Last Updated: 29Nov2020 09:50PM       Accession: [id number]

## 2023-08-15 NOTE — REASON FOR VISIT
[Follow-Up] : a follow-up visit [FreeTextEntry1] : recent UTI  [TextEntry] : Pt seen by telehealth visit, pt seen with her son and HHA who provide most of the history

## 2023-11-09 ENCOUNTER — APPOINTMENT (OUTPATIENT)
Dept: GERIATRICS | Facility: CLINIC | Age: 83
End: 2023-11-09
Payer: MEDICARE

## 2023-11-09 DIAGNOSIS — G30.9 ALZHEIMER'S DISEASE, UNSPECIFIED: ICD-10-CM

## 2023-11-09 DIAGNOSIS — F01.50 ALZHEIMER'S DISEASE, UNSPECIFIED: ICD-10-CM

## 2023-11-09 DIAGNOSIS — F02.80 ALZHEIMER'S DISEASE, UNSPECIFIED: ICD-10-CM

## 2023-11-09 DIAGNOSIS — R39.9 UNSPECIFIED SYMPTOMS AND SIGNS INVOLVING THE GENITOURINARY SYSTEM: ICD-10-CM

## 2023-11-09 PROBLEM — E78.5 HYPERLIPIDEMIA, UNSPECIFIED: Chronic | Status: ACTIVE | Noted: 2023-08-08

## 2023-11-09 PROCEDURE — 99447 NTRPROF PH1/NTRNET/EHR 11-20: CPT

## 2023-11-15 ENCOUNTER — NON-APPOINTMENT (OUTPATIENT)
Age: 83
End: 2023-11-15

## 2023-12-22 DIAGNOSIS — R26.81 UNSTEADINESS ON FEET: ICD-10-CM

## 2023-12-22 RX ORDER — CEPHALEXIN 500 MG/1
500 CAPSULE ORAL TWICE DAILY
Qty: 10 | Refills: 0 | Status: COMPLETED | COMMUNITY
Start: 2023-11-09 | End: 2023-12-22

## 2024-02-05 ENCOUNTER — RX RENEWAL (OUTPATIENT)
Age: 84
End: 2024-02-05

## 2024-02-05 RX ORDER — VALSARTAN 80 MG/1
80 TABLET, COATED ORAL
Qty: 30 | Refills: 3 | Status: ACTIVE | COMMUNITY
Start: 2020-10-05 | End: 1900-01-01

## 2024-03-13 ENCOUNTER — RX RENEWAL (OUTPATIENT)
Age: 84
End: 2024-03-13

## 2024-03-13 RX ORDER — MIRTAZAPINE 7.5 MG/1
7.5 TABLET, FILM COATED ORAL
Qty: 30 | Refills: 3 | Status: ACTIVE | COMMUNITY
Start: 2023-12-22 | End: 1900-01-01

## 2024-04-04 ENCOUNTER — RX RENEWAL (OUTPATIENT)
Age: 84
End: 2024-04-04

## 2024-04-04 RX ORDER — QUETIAPINE FUMARATE 50 MG/1
50 TABLET ORAL EVERY 8 HOURS
Qty: 90 | Refills: 3 | Status: ACTIVE | COMMUNITY
Start: 2022-03-04 | End: 1900-01-01

## 2024-07-05 ENCOUNTER — RX RENEWAL (OUTPATIENT)
Age: 84
End: 2024-07-05

## 2024-09-13 ENCOUNTER — RX RENEWAL (OUTPATIENT)
Age: 84
End: 2024-09-13

## 2025-01-03 ENCOUNTER — RX RENEWAL (OUTPATIENT)
Age: 85
End: 2025-01-03

## 2025-07-29 ENCOUNTER — RX RENEWAL (OUTPATIENT)
Age: 85
End: 2025-07-29

## 2025-08-15 ENCOUNTER — RX RENEWAL (OUTPATIENT)
Age: 85
End: 2025-08-15